# Patient Record
Sex: FEMALE | Race: WHITE | NOT HISPANIC OR LATINO | Employment: FULL TIME | URBAN - METROPOLITAN AREA
[De-identification: names, ages, dates, MRNs, and addresses within clinical notes are randomized per-mention and may not be internally consistent; named-entity substitution may affect disease eponyms.]

---

## 2017-02-24 ENCOUNTER — ALLSCRIPTS OFFICE VISIT (OUTPATIENT)
Dept: OTHER | Facility: OTHER | Age: 32
End: 2017-02-24

## 2017-02-24 DIAGNOSIS — R10.2 PELVIC AND PERINEAL PAIN: ICD-10-CM

## 2017-02-24 LAB
BILIRUB UR QL STRIP: NEGATIVE
CLARITY UR: NORMAL
COLOR UR: YELLOW
GLUCOSE (HISTORICAL): NEGATIVE
HCG, QUALITATIVE (HISTORICAL): NEGATIVE
HGB UR QL STRIP.AUTO: NEGATIVE
KETONES UR STRIP-MCNC: NEGATIVE MG/DL
LEUKOCYTE ESTERASE UR QL STRIP: NEGATIVE
NITRITE UR QL STRIP: NEGATIVE
PH UR STRIP.AUTO: 6 [PH]
PROT UR STRIP-MCNC: NEGATIVE MG/DL
SP GR UR STRIP.AUTO: 1.01
UROBILINOGEN UR QL STRIP.AUTO: 3.5

## 2017-02-27 ENCOUNTER — APPOINTMENT (OUTPATIENT)
Dept: LAB | Facility: HOSPITAL | Age: 32
End: 2017-02-27
Payer: COMMERCIAL

## 2017-02-27 DIAGNOSIS — R10.2 PELVIC AND PERINEAL PAIN: ICD-10-CM

## 2017-02-27 LAB
BILIRUB UR QL STRIP: NEGATIVE
CLARITY UR: CLEAR
COLOR UR: YELLOW
GLUCOSE UR STRIP-MCNC: NEGATIVE MG/DL
HGB UR QL STRIP.AUTO: NEGATIVE
KETONES UR STRIP-MCNC: NEGATIVE MG/DL
LEUKOCYTE ESTERASE UR QL STRIP: NEGATIVE
NITRITE UR QL STRIP: NEGATIVE
PH UR STRIP.AUTO: 6.5 [PH] (ref 4.5–8)
PROT UR STRIP-MCNC: NEGATIVE MG/DL
SP GR UR STRIP.AUTO: 1.01 (ref 1–1.03)
UROBILINOGEN UR QL STRIP.AUTO: 0.2 E.U./DL

## 2017-02-27 PROCEDURE — 81003 URINALYSIS AUTO W/O SCOPE: CPT

## 2017-03-01 ENCOUNTER — HOSPITAL ENCOUNTER (OUTPATIENT)
Dept: RADIOLOGY | Facility: HOSPITAL | Age: 32
Discharge: HOME | End: 2017-03-01
Payer: COMMERCIAL

## 2017-03-01 DIAGNOSIS — R10.2 PELVIC AND PERINEAL PAIN: ICD-10-CM

## 2017-03-01 PROCEDURE — 76830 TRANSVAGINAL US NON-OB: CPT

## 2017-03-01 PROCEDURE — 76856 US EXAM PELVIC COMPLETE: CPT

## 2017-03-02 ENCOUNTER — GENERIC CONVERSION - ENCOUNTER (OUTPATIENT)
Dept: OTHER | Facility: OTHER | Age: 32
End: 2017-03-02

## 2017-03-08 ENCOUNTER — ALLSCRIPTS OFFICE VISIT (OUTPATIENT)
Dept: OTHER | Facility: OTHER | Age: 32
End: 2017-03-08

## 2017-05-30 ENCOUNTER — ALLSCRIPTS OFFICE VISIT (OUTPATIENT)
Dept: OTHER | Facility: OTHER | Age: 32
End: 2017-05-30

## 2017-05-30 DIAGNOSIS — R22.2 LOCALIZED SWELLING, MASS AND LUMP, TRUNK: ICD-10-CM

## 2017-06-08 ENCOUNTER — HOSPITAL ENCOUNTER (OUTPATIENT)
Dept: RADIOLOGY | Facility: HOSPITAL | Age: 32
Discharge: HOME | End: 2017-06-08
Payer: COMMERCIAL

## 2017-06-08 ENCOUNTER — GENERIC CONVERSION - ENCOUNTER (OUTPATIENT)
Dept: OTHER | Facility: OTHER | Age: 32
End: 2017-06-08

## 2017-06-08 DIAGNOSIS — R22.2 LOCALIZED SWELLING, MASS AND LUMP, TRUNK: ICD-10-CM

## 2017-06-08 PROCEDURE — 76705 ECHO EXAM OF ABDOMEN: CPT

## 2017-06-29 ENCOUNTER — TRANSCRIBE ORDERS (OUTPATIENT)
Dept: LAB | Facility: HOSPITAL | Age: 32
End: 2017-06-29

## 2017-06-29 ENCOUNTER — APPOINTMENT (OUTPATIENT)
Dept: LAB | Facility: HOSPITAL | Age: 32
End: 2017-06-29
Payer: COMMERCIAL

## 2017-06-29 DIAGNOSIS — Z00.8 HEALTH EXAMINATION IN POPULATION SURVEY: Primary | ICD-10-CM

## 2017-06-29 DIAGNOSIS — Z00.8 HEALTH EXAMINATION IN POPULATION SURVEY: ICD-10-CM

## 2017-06-29 LAB
CHOLEST SERPL-MCNC: 114 MG/DL (ref 50–200)
EST. AVERAGE GLUCOSE BLD GHB EST-MCNC: 94 MG/DL
HBA1C MFR BLD: 4.9 % (ref 4.2–6.3)
HDLC SERPL-MCNC: 50 MG/DL (ref 40–60)
LDLC SERPL CALC-MCNC: 54 MG/DL (ref 0–100)
TRIGL SERPL-MCNC: 52 MG/DL

## 2017-06-29 PROCEDURE — 80061 LIPID PANEL: CPT

## 2017-06-29 PROCEDURE — 36415 COLL VENOUS BLD VENIPUNCTURE: CPT

## 2017-06-29 PROCEDURE — 83036 HEMOGLOBIN GLYCOSYLATED A1C: CPT

## 2017-08-08 ENCOUNTER — ALLSCRIPTS OFFICE VISIT (OUTPATIENT)
Dept: OTHER | Facility: OTHER | Age: 32
End: 2017-08-08

## 2017-08-10 ENCOUNTER — ALLSCRIPTS OFFICE VISIT (OUTPATIENT)
Dept: OTHER | Facility: OTHER | Age: 32
End: 2017-08-10

## 2017-08-10 DIAGNOSIS — E28.2 POLYCYSTIC OVARIAN SYNDROME: ICD-10-CM

## 2017-08-10 DIAGNOSIS — R53.83 OTHER FATIGUE: ICD-10-CM

## 2017-08-10 DIAGNOSIS — N63.0 BREAST LUMP: ICD-10-CM

## 2017-08-10 DIAGNOSIS — L68.0 HIRSUTISM: ICD-10-CM

## 2017-08-15 ENCOUNTER — HOSPITAL ENCOUNTER (OUTPATIENT)
Dept: MAMMOGRAPHY | Facility: CLINIC | Age: 32
Discharge: HOME | End: 2017-08-15
Payer: COMMERCIAL

## 2017-08-15 ENCOUNTER — HOSPITAL ENCOUNTER (OUTPATIENT)
Dept: ULTRASOUND IMAGING | Facility: CLINIC | Age: 32
Discharge: HOME | End: 2017-08-15
Payer: COMMERCIAL

## 2017-08-15 DIAGNOSIS — N63.0 BREAST LUMP: ICD-10-CM

## 2017-08-15 DIAGNOSIS — R92.8 ABNORMAL FINDINGS ON DIAGNOSTIC IMAGING OF BREAST: ICD-10-CM

## 2017-08-15 PROCEDURE — 76642 ULTRASOUND BREAST LIMITED: CPT

## 2017-08-15 PROCEDURE — G0204 DX MAMMO INCL CAD BI: HCPCS

## 2017-08-15 PROCEDURE — G0279 TOMOSYNTHESIS, MAMMO: HCPCS

## 2017-08-24 ENCOUNTER — ALLSCRIPTS OFFICE VISIT (OUTPATIENT)
Dept: OTHER | Facility: OTHER | Age: 32
End: 2017-08-24

## 2017-08-25 ENCOUNTER — TRANSCRIBE ORDERS (OUTPATIENT)
Dept: LAB | Facility: HOSPITAL | Age: 32
End: 2017-08-25

## 2017-08-25 ENCOUNTER — APPOINTMENT (OUTPATIENT)
Dept: LAB | Facility: HOSPITAL | Age: 32
End: 2017-08-25
Attending: INTERNAL MEDICINE
Payer: COMMERCIAL

## 2017-08-25 DIAGNOSIS — R53.83 OTHER FATIGUE: ICD-10-CM

## 2017-08-25 DIAGNOSIS — L68.0 HIRSUTISM: ICD-10-CM

## 2017-08-25 DIAGNOSIS — E28.2 POLYCYSTIC OVARIAN SYNDROME: ICD-10-CM

## 2017-08-25 LAB
ALBUMIN SERPL BCP-MCNC: 4.5 G/DL (ref 3.5–5)
ALP SERPL-CCNC: 51 U/L (ref 46–116)
ALT SERPL W P-5'-P-CCNC: 22 U/L (ref 12–78)
ANION GAP SERPL CALCULATED.3IONS-SCNC: 8 MMOL/L (ref 4–13)
AST SERPL W P-5'-P-CCNC: 17 U/L (ref 5–45)
BASOPHILS # BLD AUTO: 0.01 THOUSANDS/ΜL (ref 0–0.1)
BASOPHILS NFR BLD AUTO: 0 % (ref 0–1)
BILIRUB SERPL-MCNC: 1.6 MG/DL (ref 0.2–1)
BUN SERPL-MCNC: 11 MG/DL (ref 5–25)
CALCIUM SERPL-MCNC: 9.5 MG/DL (ref 8.3–10.1)
CHLORIDE SERPL-SCNC: 104 MMOL/L (ref 100–108)
CO2 SERPL-SCNC: 26 MMOL/L (ref 21–32)
CREAT SERPL-MCNC: 0.79 MG/DL (ref 0.6–1.3)
EOSINOPHIL # BLD AUTO: 0.09 THOUSAND/ΜL (ref 0–0.61)
EOSINOPHIL NFR BLD AUTO: 1 % (ref 0–6)
ERYTHROCYTE [DISTWIDTH] IN BLOOD BY AUTOMATED COUNT: 12.7 % (ref 11.6–15.1)
GFR SERPL CREATININE-BSD FRML MDRD: 100 ML/MIN/1.73SQ M
GLUCOSE P FAST SERPL-MCNC: 80 MG/DL (ref 65–99)
HCT VFR BLD AUTO: 42.6 % (ref 34.8–46.1)
HGB BLD-MCNC: 14.2 G/DL (ref 11.5–15.4)
INSULIN SERPL-ACNC: 3.3 MU/L (ref 3–25)
LYMPHOCYTES # BLD AUTO: 2.07 THOUSANDS/ΜL (ref 0.6–4.47)
LYMPHOCYTES NFR BLD AUTO: 28 % (ref 14–44)
MCH RBC QN AUTO: 30.1 PG (ref 26.8–34.3)
MCHC RBC AUTO-ENTMCNC: 33.3 G/DL (ref 31.4–37.4)
MCV RBC AUTO: 90 FL (ref 82–98)
MONOCYTES # BLD AUTO: 0.44 THOUSAND/ΜL (ref 0.17–1.22)
MONOCYTES NFR BLD AUTO: 6 % (ref 4–12)
NEUTROPHILS # BLD AUTO: 4.9 THOUSANDS/ΜL (ref 1.85–7.62)
NEUTS SEG NFR BLD AUTO: 65 % (ref 43–75)
NRBC BLD AUTO-RTO: 0 /100 WBCS
PLATELET # BLD AUTO: 205 THOUSANDS/UL (ref 149–390)
PMV BLD AUTO: 11.3 FL (ref 8.9–12.7)
POTASSIUM SERPL-SCNC: 3.9 MMOL/L (ref 3.5–5.3)
PROLACTIN SERPL-MCNC: 10.4 NG/ML
PROT SERPL-MCNC: 7.7 G/DL (ref 6.4–8.2)
RBC # BLD AUTO: 4.71 MILLION/UL (ref 3.81–5.12)
SODIUM SERPL-SCNC: 138 MMOL/L (ref 136–145)
T4 FREE SERPL-MCNC: 1.01 NG/DL (ref 0.76–1.46)
TSH SERPL DL<=0.05 MIU/L-ACNC: 0.84 UIU/ML (ref 0.36–3.74)
WBC # BLD AUTO: 7.53 THOUSAND/UL (ref 4.31–10.16)

## 2017-08-25 PROCEDURE — 85025 COMPLETE CBC W/AUTO DIFF WBC: CPT

## 2017-08-25 PROCEDURE — 36415 COLL VENOUS BLD VENIPUNCTURE: CPT

## 2017-08-25 PROCEDURE — 82157 ASSAY OF ANDROSTENEDIONE: CPT

## 2017-08-25 PROCEDURE — 84443 ASSAY THYROID STIM HORMONE: CPT

## 2017-08-25 PROCEDURE — 84143 ASSAY OF 17-HYDROXYPREGNENO: CPT

## 2017-08-25 PROCEDURE — 84402 ASSAY OF FREE TESTOSTERONE: CPT

## 2017-08-25 PROCEDURE — 84439 ASSAY OF FREE THYROXINE: CPT

## 2017-08-25 PROCEDURE — 83525 ASSAY OF INSULIN: CPT

## 2017-08-25 PROCEDURE — 80053 COMPREHEN METABOLIC PANEL: CPT

## 2017-08-25 PROCEDURE — 84403 ASSAY OF TOTAL TESTOSTERONE: CPT

## 2017-08-25 PROCEDURE — 84146 ASSAY OF PROLACTIN: CPT

## 2017-08-25 PROCEDURE — 83498 ASY HYDROXYPROGESTERONE 17-D: CPT

## 2017-08-25 PROCEDURE — 82627 DEHYDROEPIANDROSTERONE: CPT

## 2017-08-26 LAB
DHEA-S SERPL-MCNC: 324.7 UG/DL (ref 84.8–378)
TESTOST FREE SERPL-MCNC: 2.1 PG/ML (ref 0–4.2)
TESTOST SERPL-MCNC: 39 NG/DL (ref 8–48)

## 2017-08-30 ENCOUNTER — GENERIC CONVERSION - ENCOUNTER (OUTPATIENT)
Dept: OTHER | Facility: OTHER | Age: 32
End: 2017-08-30

## 2017-08-30 LAB
17OHP SERPL-MCNC: 191 NG/DL
ANDROST SERPL-MCNC: 109 NG/DL (ref 41–262)

## 2017-08-31 LAB — 17OH-PREG SERPL-MCNC: 124 NG/DL

## 2017-10-10 ENCOUNTER — ALLSCRIPTS OFFICE VISIT (OUTPATIENT)
Dept: OTHER | Facility: OTHER | Age: 32
End: 2017-10-10

## 2017-10-28 NOTE — PROGRESS NOTES
Assessment    1  Dysmenorrhea (625 3) (N94 6)    Plan  Dysmenorrhea    · Ketorolac Tromethamine 10 MG Oral Tablet; TAKE 1 TABLET Every 8 hours PRNdysmenorrhea   Rx By: Josiah Huggins; Dispense: 10 Days ; #:30 Tablet; Refill: 0;Dysmenorrhea; REYNALDO = N; Verified Transmission to [address]; Last Updated By: [employer]; 10/10/2017 11:05:43 AM    Discussion/Summary  Discussion Summary:   #1  Discussed treatment options including nonsteroidals versus OCPs versus Lysteda  Risks and benefits reviewed  She will try Toradol 10 mg p o  q 8 hours p r n  dysmenorrhea dispensed 30 tablets zero refills  She will call with an update after her next cycle  Return to office July 2018 for annual GYN visit  Medication SE Review and Pt Understands Tx: Possible side effects of new medications were reviewed with the patient/guardian today  The treatment plan was reviewed with the patient/guardian  The patient/guardian understands and agrees with the treatment plan      Chief Complaint  Chief Complaint Free Text Note Form: abdominal cramping      History of Present Illness  HPI: This is a 55-year-old female G0 who presents complaining of increase in menstrual cramps  She has a long history of dysmenorrhea where she uses Motrin 800 mg on a monthly basis  In the last cycle or 2 she's had increasing cramping where Motrin was not working as well  She did follow-up with endocrinology for reevaluation for polycystic ovaries  She has been diagnosed in her early 25s  She was informed by endocrinology that all her labs were normal which was inconsistent with a PCO diagnosis  Patient has had significant lifestyle changes over the last decade  Her menstrual cycles went from amenorrhea 2 now more normal every 28-34 days lasting 6 days with no breakthrough bleeding  Her cycles are heavy 2-3 days  She did have a pelvic ultrasound in March which was essentially normal, no evidence of multi-follicles   She is not been sexually active in over 5 years  was on birth control pills in 2008  At this point she would like to hold off on hormones  She continues to follow-up with dermatology for acne  Review of Systems  Focused-Female:  Cardiovascular: no complaints of slow or fast heart rate, no chest pain, no palpitations, no leg claudication or lower extremity edema  Respiratory: no complaints of shortness of breath, no wheezing, no dyspnea on exertion, no orthopnea or PND  Gastrointestinal: no complaints of abdominal pain, no constipation, no nausea or diarrhea, no vomiting, no bloody stools  Genitourinary: as noted in HPI  ROS Reviewed:   ROS reviewed  Active Problems  1  Dysmenorrhea (625 3) (N94 6)   2  Encounter for annual routine gynecological examination (V72 31) (Z01 419)   3  Fatigue (780 79) (R53 83)   4  Female pelvic pain (625 9) (R10 2)   5  Hirsutism (704 1) (L68 0)   6  Left breast lump (611 72) (N63 20)   7  Lower back pain (724 2) (M54 5)   8  Menorrhagia (626 2) (N92 0)   9  Palpable mass of lower back (782 2) (R22 2)   10  PCOS (polycystic ovarian syndrome) (256 4) (E28 2)    Past Medical History  1  History of Acute otitis media, unspecified laterality   2  History of Arm pain (729 5) (M79 603)   3  History of Dysfunction of left eustachian tube (381 81) (H69 82)   4  History of Dysfunction of right eustachian tube (381 81) (H69 81)   5  History of acne (V13 3) (Z87 2)   6  History of backache (V13 59) (Z87 39)   7  History of pregnancy (V13 29)   8  History of Jaw pain (784 92) (R68 84)   9  History of Left shoulder pain (719 41) (M25 512)   10  History of Menarche (V21 8)   11  History of Mouth sores (528 9) (K13 79)   12  History of Otalgia, unspecified laterality (388 70) (H92 09)   13  History of Vision problem (V41 0) (H54 7)  Active Problems And Past Medical History Reviewed: The active problems and past medical history were reviewed and updated today  Surgical History  1   History of Oral Surgery Tooth Extraction   2  History of Skin Debridement Right Knee  Surgical History Reviewed: The surgical history was reviewed and updated today  Family History  Mother    1  Family history of asthma (V17 5) (Z82 5)  Father    2  Family history of hypertension (V17 49) (Z82 49)  Sister    3  Family history of Cystitis, interstitial   4  Family history of endometriosis (V19 8) (Z84 2)  Paternal Grandmother    11  Family history of lung cancer (V16 1) (Z80 1)  Maternal Grandfather    6  Family history of Prostate cancer  Family History    7  Family history of Anxiety (Symptom)   8  Family history of Depression  Family History Reviewed: The family history was reviewed and updated today  Social History     · Denied: History of Alcohol use   · Never A Smoker   · History of Vegetarian (V49 89) (Z78 9)  Social History Reviewed: The social history was reviewed and updated today  Current Meds   1  Epiduo 0 1-2 5 % External Gel; APPLY 45 GM Daily one application every day  Requested for: 80PQW9121; Last Rx:28Oct2016 Ordered   2  Ibuprofen 800 MG Oral Tablet; TAKE 1 TABLET 3 TIMES DAILY AS NEEDED; Therapy: 47EUZ4638 to (Evaluate:16Nov2016)  Requested for: 00AYV2811; Last Rx:48Oan7407 Ordered   3  Multi-Day Vitamins TABS; Therapy: (Recorded:08Mar2014) to Recorded   4  Omega-3 CAPS; Therapy: (Trey Truong) to Recorded   5  TiZANidine HCl - 2 MG Oral Tablet; 1 tab q 8hours prn pain; Therapy: 45YQE0084 to (Evaluate:06Jun2017)  Requested for: 71ZTD0272; Last Rx:86Mef2587 Ordered  Medication List Reviewed: The medication list was reviewed and updated today  Allergies  1  Amoxicillin CAPS   2  Penicillins   3  Sulfa Drugs  4   Mold    Vitals  Vital Signs    Recorded: 04HLL1616 33:69YU   Systolic 481   Diastolic 70   Height 5 ft 6 in   Weight 162 lb 0 96 oz   BMI Calculated 26 16   BSA Calculated 1 84   LMP 05-Oct-2017       Physical Exam   Constitutional  General appearance: No acute distress, well appearing and well nourished  Pulmonary  Auscultation of lungs: Clear to auscultation  Cardiovascular  Auscultation of heart: Normal rate and rhythm, normal S1 and S2, no murmurs  Future Appointments    Date/Time Provider Specialty Site   02/08/2018 03:30 PM KEVEN Fraga   [facility]       Signatures   Electronically signed by : Son Gaitan DO; Oct 10 2017 11:09AM EST                       (Author)

## 2017-12-07 ENCOUNTER — ALLSCRIPTS OFFICE VISIT (OUTPATIENT)
Dept: OTHER | Facility: OTHER | Age: 32
End: 2017-12-07

## 2017-12-08 NOTE — PROGRESS NOTES
Assessment    1  Non smoker / tobacco user (V44 89) (Z78 9)   2  Acute upper respiratory infection (465 9) (J06 9)    Plan  Acute upper respiratory infection    · Azithromycin 250 MG Oral Tablet; TAKE 2 TABLETS ON DAY 1 THEN TAKE 1TABLET A DAY FOR 4 DAYS    Discussion/Summary    Increase oral fluidsmeds as neededif not improvng  The patient was counseled regarding impressions  Possible side effects of new medications were reviewed with the patient/guardian today  The treatment plan was reviewed with the patient/guardian  The patient/guardian understands and agrees with the treatment plan      Chief Complaint  Patient presents to office today with a cold that started Monday  Patient has been coughing up yellowish phlegm  She has been taking OTC medication  History of Present Illness  HPI: 3 days of scratchy throat, sinus congestion drainage productive coughfever chills body achesvomiting diarrheawork coming upwas just diagnosed with pancreatic CA      Review of Systems   Constitutional: as noted in HPI   ENT: as noted in HPI  Respiratory: as noted in HPI  Gastrointestinal: as noted in HPI  Active Problems  1  Dysmenorrhea (625 3) (N94 6)   2  Encounter for annual routine gynecological examination (V72 31) (Z01 419)   3  Fatigue (780 79) (R53 83)   4  Female pelvic pain (625 9) (R10 2)   5  Hirsutism (704 1) (L68 0)   6  Left breast lump (611 72) (N63 20)   7  Lower back pain (724 2) (M54 5)   8  Menorrhagia (626 2) (N92 0)   9  Palpable mass of lower back (782 2) (R22 2)   10  PCOS (polycystic ovarian syndrome) (256 4) (E28 2)    Past Medical History  Active Problems And Past Medical History Reviewed: The active problems and past medical history were reviewed and updated today  Surgical History  Surgical History Reviewed: The surgical history was reviewed and updated today         Social History     · Denied: History of Alcohol use   · Non smoker / tobacco user (H65 89) (Z78 9)   · History of Vegetarian (V49 89) (Z78 9)  The social history was reviewed and updated today  Family History  Family History Reviewed: The family history was reviewed and updated today  Current Meds   1  Epiduo 0 1-2 5 % External Gel; APPLY 45 GM Daily one application every day  Requested for: 93UER1130; Last Rx:28Oct2016 Ordered   2  Ibuprofen 800 MG Oral Tablet; TAKE 1 TABLET 3 TIMES DAILY AS NEEDED; Therapy: 48KGP9626 to (Evaluate:16Nov2016)  Requested for: 12YWQ6438; Last Rx:67Hkq6278 Ordered   3  Ketorolac Tromethamine 10 MG Oral Tablet; TAKE 1 TABLET Every 8 hours PRN dysmenorrhea; Therapy: 64YDM9782 to (Georg Ormond)  Requested for: 50XER8511; Last Rx:10Oct2017 Ordered   4  Multi-Day Vitamins TABS; Therapy: (Recorded:08Mar2014) to Recorded   5  [place]-3 CAPS; Therapy: (Kt Jim) to Recorded   6  TiZANidine HCl - 2 MG Oral Tablet; 1 tab q 8hours prn pain; Therapy: 68TZX6769 to (Evaluate:06Jun2017)  Requested for: 28PMM3199; Last Rx:34Zqt5636 Ordered    The medication list was reviewed and updated today  Allergies  1  Amoxicillin CAPS   2  Penicillins   3  Sulfa Drugs  4  Mold    Vitals   Recorded: 74WVX8736 03:05PM   Temperature 99 8 F, Oral   Heart Rate 170   Systolic 107   Diastolic 76   Height 5 ft 6 in   Weight 161 lb 2 oz   BMI Calculated 26 01   BSA Calculated 1 82   O2 Saturation 99       Physical Exam   Constitutional  General appearance: Abnormal   appears tired  Ears, Nose, Mouth, and Throat  Otoscopic examination: Tympanic membranes translucent with normal light reflex  Canals patent without erythema  Oropharynx: Normal with no erythema, edema, exudate or lesions  Pulmonary  Respiratory effort: No increased work of breathing or signs of respiratory distress  Auscultation of lungs: Clear to auscultation  Cardiovascular  Auscultation of heart: Normal rate and rhythm, normal S1 and S2, without murmurs  Abdomen  Abdomen: Non-tender, no masses     Liver and spleen: No hepatomegaly or splenomegaly  Lymphatic  Palpation of lymph nodes in neck: No lymphadenopathy  Musculoskeletal  Gait and station: Normal    Psychiatric  Orientation to person, place, and time: Normal    Mood and affect: Normal          Message  Return to work or school:   Alice Sutherland is under my professional care  She was seen in my office on 12/7/17       Please excuse from work 12/7/17-12/8/17 dx: acute upper respiratory infection  Future Appointments    Date/Time Provider Specialty Site   02/08/2018 03:30 PM KEVEN Gregory   [facility]       Signatures   Electronically signed by : KEVEN Valdez ; Dec  7 2017  3:19PM EST                       (Author)

## 2018-01-10 NOTE — RESULT NOTES
Verified Results  US ABDOMEN LIMITED 41Dox9996 08:14AM Riki Marino   TW Order Number: [id number]   Performing Comments: right lower back lesion   - Patient Instructions: To schedule this appointment, please contact Central Scheduling at [phone]  Test Name Result Flag Reference   US ABDOMEN LIMITED (Report)     ULTRASOUND RIGHT BACK     TECHNIQUE:  Using a high frequency transducer, the right back was scanned to to evaluate for lump  INDICATION: 35-year-old female who describes a vague, palpable lump in the right lower back since February  COMPARISON: Abdomen and pelvic CT from 11/29/2004  FINDINGS:     Over the right lower back where patient feels a lump, ultrasound demonstrates no definite mass  On image 18, a 3 6 x 1 4 x 2 9 cm area in the subcutaneous fat was measured, which could potentially be a subcutaneous lipoma, but it does not definitely    stand out adjacent to the rest of the subcutaneous fat, and is symmetric to the left side  IMPRESSION:     No definite mass identified in the right lower back where patient feels a lump  A subcutaneous lipoma in that area cannot be completely excluded, but there are no suspicious masses         Workstation performed: [id number]     Signed by:   Aisha Underwood MD   6/8/17

## 2018-01-12 VITALS
DIASTOLIC BLOOD PRESSURE: 76 MMHG | BODY MASS INDEX: 26.04 KG/M2 | WEIGHT: 162.03 LBS | HEIGHT: 66 IN | SYSTOLIC BLOOD PRESSURE: 120 MMHG | TEMPERATURE: 98.5 F | OXYGEN SATURATION: 99 % | RESPIRATION RATE: 18 BRPM | HEART RATE: 87 BPM

## 2018-01-12 NOTE — RESULT NOTES
Verified Results  (1) TSH 73CGA4519 10:07AM Suma Silver Order Number: [id number]  TW Order Number: [id number] Order Number: [id number] Order Number: [id number] Order Number: [id number] Patient Instructions: This bloodwork is non-fasting  Please drink two glasses of water morning of     Test Name Result Flag Reference   TSH 1 350 uIU/mL  0 358-3 740   - Patient Instructions: This bloodwork is non-fasting  Please drink two glasses of water morning of bloodwork  bloodwork  Patients undergoing fluorescein dye angiography may retain small amounts of fluorescein in the body for 48-72 hours post procedure  Samples containing fluorescein can produce falsely depressed TSH values  If the patient had this procedure,a specimen should be resubmitted post fluorescein clearance            The recommended reference ranges for TSH during pregnancy are as follows:  First trimester 0 1 to 2 5 uIU/mL  Second trimester  0 2 to 3 0 uIU/mL  Third trimester 0 3 to 3 0 uIU/m

## 2018-01-12 NOTE — PROGRESS NOTES
Assessment    1  Female pelvic pain (625 9) (R10 2)    Discussion/Summary  Discussion Summary:   I believe this patient probably had a ruptured ovarian cyst which is now resolved  She is counseled if this happens again reminded consider suppression with the birth control pill  She will watch and consider  She'll keep us informed of her progress  Self Referrals:   Self Referrals: Yes      Chief Complaint  Chief Complaint Free Text Note Form: Pelvic pain    This is a 61-year-old white female, she is nulliparous  She has a history of PCO and a septated uterus  She is not sexually active  She's now being evaluated for right-sided pelvic pain that began around 13 February  It was sharp last almost 2 weeks  She did take ibuprofen get relief  She was seen by her family doctor who ordered a urinalysis which is negative  We also ordered an ultrasound on 1 March  Ultrasound was reviewed today and it was completely negative both ovaries were normal  The pain is now resolved  Abdominal exam was benign there is no CVA tenderness  Pelvic exam was deferred because of the ultrasound  Active Problems    1  Dysmenorrhea (625 3) (N94 6)   2  Encounter for annual routine gynecological examination (V72 31) (Z01 419)   3  Female pelvic pain (625 9) (R10 2)   4  Left breast lump (611 72) (N63)   5  Menorrhagia (626 2) (N92 0)   6  PCOS (polycystic ovarian syndrome) (256 4) (E28 2)    Past Medical History    1  History of Acute otitis media, unspecified laterality   2  History of Arm pain (729 5) (M79 603)   3  History of Dysfunction of left eustachian tube (381 81) (H69 82)   4  History of Dysfunction of right eustachian tube (381 81) (H69 81)   5  History of acne (V13 3) (Z87 2)   6  History of backache (V13 59) (Z87 39)   7  History of pregnancy (V13 29)   8  History of Jaw pain (784 92) (R68 84)   9  History of Left shoulder pain (719 41) (M25 512)   10  History of Menarche (V21 8)   11   History of Mouth sores (528 9) (K13 79)   12  History of Otalgia, unspecified laterality (388 70) (H92 09)   13  History of Vision problem (V41 0) (H54 7)    Surgical History    1  History of Oral Surgery Tooth Extraction   2  History of Skin Debridement Right Knee    Family History  Mother    1  Family history of asthma (V17 5) (Z82 5)  Father    2  Family history of hypertension (V17 49) (Z82 49)  Paternal Grandmother    3  Family history of lung cancer (V16 1) (Z80 1)  Maternal Grandfather    4  Family history of Prostate cancer  Family History    5  Family history of Anxiety (Symptom)   6  Family history of Depression    Social History    · Denied: History of Alcohol use   · Never A Smoker   · Non smoker (V49 89) (Z78 9)   · History of Vegetarian (V49 89) (Z78 9)    Current Meds   1  Epiduo 0 1-2 5 % External Gel; APPLY 45 GM Daily one application every day  Requested   for: 38DUA9681; Last Rx:28Oct2016 Ordered   2  Ibuprofen 800 MG Oral Tablet; TAKE 1 TABLET 3 TIMES DAILY AS NEEDED; Therapy: 68YJL6919 to (Evaluate:16Nov2016)  Requested for: 74YQJ3596; Last   Rx:69Zfd9710 Ordered   3  Multi-Day Vitamins TABS; Therapy: (Recorded:08Mar2014) to Recorded   4  Omega-3 CAPS; Therapy: (Recorded:74Hfn9896) to Recorded    Allergies    1  Amoxicillin CAPS   2  Penicillins   3  Sulfa Drugs    4   Mold    Vitals  Vital Signs    Recorded: 22JGE8424 45:50IN   Systolic 998   Diastolic 72   Height 5 ft 6 in   Weight 161 lb    BMI Calculated 25 99   BSA Calculated 1 82   LMP 85SAA5415     Signatures   Electronically signed by : KEVEN Saleh ; Mar  8 2017  2:38PM EST                       (Author)

## 2018-01-13 VITALS
DIASTOLIC BLOOD PRESSURE: 72 MMHG | WEIGHT: 161 LBS | SYSTOLIC BLOOD PRESSURE: 118 MMHG | BODY MASS INDEX: 25.88 KG/M2 | HEIGHT: 66 IN

## 2018-01-13 VITALS
TEMPERATURE: 97.6 F | DIASTOLIC BLOOD PRESSURE: 62 MMHG | BODY MASS INDEX: 25.56 KG/M2 | HEIGHT: 66 IN | OXYGEN SATURATION: 99 % | HEART RATE: 72 BPM | SYSTOLIC BLOOD PRESSURE: 106 MMHG | WEIGHT: 159.04 LBS | RESPIRATION RATE: 16 BRPM

## 2018-01-13 VITALS
DIASTOLIC BLOOD PRESSURE: 70 MMHG | SYSTOLIC BLOOD PRESSURE: 110 MMHG | WEIGHT: 162.06 LBS | HEIGHT: 66 IN | BODY MASS INDEX: 26.05 KG/M2

## 2018-01-13 VITALS
WEIGHT: 162 LBS | BODY MASS INDEX: 26.03 KG/M2 | OXYGEN SATURATION: 99 % | DIASTOLIC BLOOD PRESSURE: 68 MMHG | TEMPERATURE: 98.9 F | HEIGHT: 66 IN | RESPIRATION RATE: 16 BRPM | HEART RATE: 89 BPM | SYSTOLIC BLOOD PRESSURE: 110 MMHG

## 2018-01-13 VITALS
HEIGHT: 66 IN | HEART RATE: 64 BPM | DIASTOLIC BLOOD PRESSURE: 76 MMHG | SYSTOLIC BLOOD PRESSURE: 120 MMHG | BODY MASS INDEX: 26.2 KG/M2 | WEIGHT: 163.06 LBS

## 2018-01-13 NOTE — RESULT NOTES
Discussion/Summary   labs normal     Verified Results  (1) COMPREHENSIVE METABOLIC PANEL 63ZHH0831 99:12OS Tigre Hart Order Number: [id number]     Test Name Result Flag Reference   SODIUM 138 mmol/L  136-145   POTASSIUM 3 9 mmol/L  3 5-5 3   CHLORIDE 104 mmol/L  100-108   CARBON DIOXIDE 26 mmol/L  21-32   ANION GAP (CALC) 8 mmol/L  4-13   BLOOD UREA NITROGEN 11 mg/dL  5-25   CREATININE 0 79 mg/dL  0 60-1 30   Standardized to IDMS reference method   CALCIUM 9 5 mg/dL  8 3-10 1   BILI, TOTAL 1 60 mg/dL H 0 20-1 00   ALK PHOSPHATAS 51 U/L     ALT (SGPT) 22 U/L  12-78   Specimen collection should occur prior to Sulfasalazine and/or Sulfapyridine administration due to the potential for falsely depressed results  AST(SGOT) 17 U/L  5-45   Specimen collection should occur prior to Sulfasalazine administration due to the potential for falsely depressed results  ALBUMIN 4 5 g/dL  3 5-5 0   TOTAL PROTEIN 7 7 g/dL  6 4-8 2   eGFR 100 ml/min/1 73sq m     National Kidney Disease Education Program recommendations are as follows:  GFR calculation is accurate only with a steady state creatinine  Chronic Kidney disease less than 60 ml/min/1 73 sq  meters  Kidney failure less than 15 ml/min/1 73 sq  meters  GLUCOSE FASTING 80 mg/dL  65-99   Specimen collection should occur prior to Sulfasalazine administration due to the potential for falsely depressed results  Specimen collection should occur prior to Sulfapyridine administration due to the potential for falsely elevated results       (1) INSULIN, FASTING 95Nsu5131 11:04AM Tigre Hart Order Number: [id number]     Test Name Result Flag Reference   INSULIN, FASTING 3 3 mU/L  3 0-25 0     (1) TSH 76Uhd5646 11:04AM Wyatt Godoy    Order Number: [id number]     Test Name Result Flag Reference   TSH 0 842 uIU/mL  0 358-3 740   Patients undergoing fluorescein dye angiography may retain small amounts of fluorescein in the body for 48-72 hours post procedure  Samples containing fluorescein can produce falsely depressed TSH values  If the patient had this procedure,a specimen should be resubmitted post fluorescein clearance  The recommended reference ranges for TSH during pregnancy are as follows:  First trimester 0 1 to 2 5 uIU/mL  Second trimester  0 2 to 3 0 uIU/mL  Third trimester 0 3 to 3 0 uIU/m     (1) T4, FREE 78Izi9226 11:04AM Irving Crawford    Order Number: [id number]     Test Name Result Flag Reference   T4,FREE 1 01 ng/dL  0 76-1 46   Specimen collection should occur prior to Sulfasalazine administration due to the potential for falsely elevated results  (1) PROLACTIN 10Pgs3919 11:04AM Db Jaimes Order Number: [id number]     Test Name Result Flag Reference   PROLACTIN 10 4 ng/mL     PROLACTIN:     Females:   ? ??Non-pregnant ??? ???2 2-30 3 ? ? ?ng/mL   ? ??Pregnant ??? ??? ??? ???8 1-347 6 ng/mL   ? ? ?Post-menopausal 0 7-31 5 ? ??ng/mL     (1) TESTOSTERONE, FREE (DIRECT) AND TOTAL 65Zpq5430 11:04AM Db Jaimes Order Number: [id number]     Test Name Result Flag Reference   FREE TESTOSTERONE, DIRECT 2 1 pg/mL  0 0 - 4 2   TESTOSTERONE (TOTAL) 39 ng/dL  8 - 48   Performed at:  [address] [place] [address]  [id number]  : Natasha Tamayo MD, Phone:  [phone]     (1) DHEA-S 60IKY7588 11:04AM Db Jaimes Order Number: [id number]     Test Name Result Flag Reference   DHEA-SULFATE 324 7 ug/dL  84 8 - 378 0   Performed at:  [facility] [place] [address]  [id number]  : Natasha Tamayo MD, Phone:  [phone]     (1) 48-C-KS-PROGESTERONE 66MLQ1631 11:04AM Db Jaimes Order Number: [id number]     Test Name Result Flag Reference   17-A(OH)PROGEST 191 ng/dL     Adult Female                             Follicular        15 -  70                             Luteal            35 - 290  This test was developed and its performance characteristics  determined by [facility]  It has not been cleared or approved  by the Food and Drug Administration  Performed at:  [address], [place]  [id number]  : Parker García MD, Phone:  [phone]     (1) ANDROSTENEDIONE 28BYE4690 11:04AM Arthor Brittle Order Number: [id number]     Test Name Result Flag Reference   ANDROSTENEDIONE 109 ng/dL  41 - 262   This test was developed and its performance characteristics  determined by [facility]  It has not been cleared or approved  by the Food and Drug Administration  Performed at:  [address], [place]  [id number]  : Parker García MD, Phone:  [phone]     (1) CBC/PLT/DIFF 07YDP2926 11:04AM Zeeboor Brittle Order Number: [id number]     Test Name Result Flag Reference   WBC COUNT 7 53 Thousand/uL  4 31-10 16   RBC COUNT 4 71 Million/uL  3 81-5 12   HEMOGLOBIN 14 2 g/dL  11 5-15 4   HEMATOCRIT 42 6 %  34 8-46  1   MCV 90 fL  82-98   MCH 30 1 pg  26 8-34 3   MCHC 33 3 g/dL  31 4-37 4   RDW 12 7 %  11 6-15 1   MPV 11 3 fL  8 9-12 7   PLATELET COUNT 620 Thousands/uL  149-390   nRBC AUTOMATED 0 /100 WBCs     NEUTROPHILS RELATIVE PERCENT 65 %  43-75   LYMPHOCYTES RELATIVE PERCENT 28 %  14-44   MONOCYTES RELATIVE PERCENT 6 %  4-12   EOSINOPHILS RELATIVE PERCENT 1 %  0-6   BASOPHILS RELATIVE PERCENT 0 %  0-1   NEUTROPHILS ABSOLUTE COUNT 4 90 Thousands/? ??L  1 85-7 62   LYMPHOCYTES ABSOLUTE COUNT 2 07 Thousands/? ??L  0 60-4 47   MONOCYTES ABSOLUTE COUNT 0 44 Thousand/? ??L  0 17-1 22   EOSINOPHILS ABSOLUTE COUNT 0 09 Thousand/? ??L  0 00-0 61   BASOPHILS ABSOLUTE COUNT 0 01 Thousands/? ??L  0 00-0 10

## 2018-01-14 VITALS
WEIGHT: 163 LBS | BODY MASS INDEX: 26.2 KG/M2 | DIASTOLIC BLOOD PRESSURE: 74 MMHG | HEIGHT: 66 IN | SYSTOLIC BLOOD PRESSURE: 120 MMHG

## 2018-01-14 NOTE — RESULT NOTES
Message   Ultrasound shows sub-septated uterus (I think she knows about this) and a  right ovarian follicle with some fluid around it  Follow up with gyn next week as scheduled           Verified Results  * US PELVIS COMPLETE [facility] AND TRANSVAGINAL) [id number] 07:19AM Simon Gee Order Number: [id number]    - Patient Instructions: To schedule this appointment, please contact Central Scheduling at [phone]  Test Name Result Flag Reference   US PELVIS COMPLETE (TRANSABDOMINAL AND TRANSVAGINAL) (Report)     PELVIC ULTRASOUND, COMPLETE     INDICATION: 35-year-old female with lower pelvic pain radiating to the right lower back  The patient has an MRI confirmed sub-septate uterine anomaly  LMP was 2/3/2017  COMPARISON: None  TECHNIQUE:  Transabdominal pelvic ultrasound was performed in sagittal and transverse planes with a curvilinear transducer  Additional transvaginal imaging was performed to better evaluate the endometrium and ovaries  Imaging included volumetric    sweeps as well as traditional still imaging technique  FINDINGS:     UTERUS:   The subseptated uterus is anteverted in position, measuring 8 9 x 3 6 x 6 5 cm  Contour and echotexture appear normal      The cervix shows no suspicious abnormality  ENDOMETRIUM:    Normal endometrial caliber of the right-sided moiety measuring 14 mm  The left-sided moiety measures 11 mm  Homogenous and normal in appearance  OVARIES/ADNEXA:   Right ovary: 3 9 x 2 6 x 3 3 cm  No suspicious right ovarian abnormality  An involuting follicle is noted with minimal fluid adjacent to the ovary, physiologic  Doppler flow within normal limits  Left ovary: 3 2 x 1 8 x 3 9 cm  No suspicious left ovarian abnormality  Doppler flow within normal limits  No suspicious adnexal mass or loculated collections  There is no free fluid  IMPRESSION:        1  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

## 2018-01-15 NOTE — RESULT NOTES
Verified Results  (1) T4, FREE [id number] 10:07AM Rik Vargas    Order Number: [id number]  TW Order Number: [id number] Order Number: [id number] Order Number: [id number] Order Number: [id number] Patient Instructions: This bloodwork is non-fasting  Please drink two glasses of water morning of     Test Name Result Flag Reference   T4,FREE 0 86 ng/dL  0 76-1 46   bloodwork  Plan  Encounter for annual routine gynecological examination    · (1) THIN PREP PAP WITH IMAGING; Status: In Progress - Specimen/Data Collected;    Done: [id number]  Maturation index required? : No  : 06/2016  HPV? : if ASCUS

## 2018-01-23 VITALS
DIASTOLIC BLOOD PRESSURE: 76 MMHG | HEART RATE: 105 BPM | WEIGHT: 161.13 LBS | HEIGHT: 66 IN | TEMPERATURE: 99.8 F | OXYGEN SATURATION: 99 % | SYSTOLIC BLOOD PRESSURE: 116 MMHG | BODY MASS INDEX: 25.9 KG/M2

## 2018-01-23 NOTE — MISCELLANEOUS
Message  Return to work or school:   Jeff Valadez is under my professional care  She was seen in my office on 12/7/17       Please excuse from work 12/7/17-12/8/17 dx: acute upper respiratory infection  Past Medical History  Active Problems And Past Medical History Reviewed: The active problems and past medical history were reviewed and updated today  Family History  Family History Reviewed: The family history was reviewed and updated today  Surgical History  Surgical History Reviewed: The surgical history was reviewed and updated today         Future Appointments    Signatures   Electronically signed by : KEVEN Dover ; Dec  7 2017  3:19PM EST                       (Author)

## 2018-02-08 ENCOUNTER — OFFICE VISIT (OUTPATIENT)
Dept: SURGICAL ONCOLOGY | Facility: CLINIC | Age: 33
End: 2018-02-08
Payer: COMMERCIAL

## 2018-02-08 VITALS
RESPIRATION RATE: 16 BRPM | SYSTOLIC BLOOD PRESSURE: 112 MMHG | WEIGHT: 142 LBS | TEMPERATURE: 98.8 F | HEIGHT: 66 IN | HEART RATE: 95 BPM | DIASTOLIC BLOOD PRESSURE: 70 MMHG | BODY MASS INDEX: 22.82 KG/M2

## 2018-02-08 DIAGNOSIS — N60.11 FIBROCYSTIC BREAST CHANGES OF BOTH BREASTS: Primary | ICD-10-CM

## 2018-02-08 DIAGNOSIS — R92.2 DENSE BREAST TISSUE: ICD-10-CM

## 2018-02-08 DIAGNOSIS — N60.12 FIBROCYSTIC BREAST CHANGES OF BOTH BREASTS: Primary | ICD-10-CM

## 2018-02-08 PROBLEM — R92.30 DENSE BREAST TISSUE: Status: ACTIVE | Noted: 2018-02-08

## 2018-02-08 PROCEDURE — 99214 OFFICE O/P EST MOD 30 MIN: CPT | Performed by: SURGERY

## 2018-02-08 RX ORDER — IBUPROFEN 800 MG/1
1 TABLET ORAL 3 TIMES DAILY PRN
COMMUNITY
Start: 2016-07-19 | End: 2019-08-29 | Stop reason: SDUPTHER

## 2018-02-08 RX ORDER — KETOROLAC TROMETHAMINE 10 MG/1
TABLET, FILM COATED ORAL EVERY 8 HOURS
COMMUNITY
Start: 2017-10-10 | End: 2018-05-24

## 2018-02-08 RX ORDER — MULTIVITAMIN
TABLET ORAL
COMMUNITY

## 2018-02-08 RX ORDER — ADAPALENE AND BENZOYL PEROXIDE .1; 2.5 G/100G; G/100G
GEL TOPICAL DAILY
COMMUNITY
End: 2018-05-24

## 2018-02-08 NOTE — PROGRESS NOTES
Surgical Oncology Follow Up       [address]  [facility] [address] [address]    Adam Resendiz  1985  [id number]  [address]  [facility] [address] [address]    No chief complaint on file  left breast lump    Assessment/Plan     Advance Care Planning/Advance Directives:  Did not discuss  with the patient  No history exists  History of Present Illness:  Patient states she feels the area of concern in the left breast but also has lumpy tissue throughout on both sides, she reports some discomfort that is worse around the time of her period  -Interval History:n/a    Review of Systems:  Review of Systems   Constitutional: Negative  Negative for appetite change and fever  Eyes: Negative  Respiratory: Negative for shortness of breath  Cardiovascular: Negative  Gastrointestinal: Negative  Endocrine: Negative  Genitourinary: Negative  Musculoskeletal: Negative  Negative for arthralgias and myalgias  Skin: Negative  Allergic/Immunologic: Negative  Neurological: Negative  Hematological: Negative  Negative for adenopathy  Does not bruise/bleed easily  Psychiatric/Behavioral: Negative  Patient Active Problem List   Diagnosis    Fibrocystic breast changes of both breasts    Dense breast tissue     Past Medical History:   Diagnosis Date    PCOS (polycystic ovarian syndrome)     Upper back pain     Wears glasses      No past surgical history on file  Family History   Problem Relation Age of Onset    Prostate cancer Maternal Grandfather [de-identified]    Lung cancer Paternal Grandmother 72    Psychiatric Illness Family     Pancreatic cancer Mother 61     Social History     Social History    Marital status: Single     Spouse name: N/A    Number of children: N/A    Years of education: N/A     Occupational History    Not on file  Social History Main Topics    Smoking status: Never Smoker    Smokeless tobacco: Never Used    Alcohol use No    Drug use: No    Sexual activity: Not on file     Other Topics Concern    Not on file     Social History Narrative    No narrative on file       Current Outpatient Prescriptions:     Adapalene-Benzoyl Peroxide (EPIDUO) 0 1-2 5 % gel, Apply topically Daily, Disp: , Rfl:     Flax Oil-Fish Oil-Borage Oil ([employer] OMEGA-3) CAPS, Take by mouth, Disp: , Rfl:     ibuprofen (MOTRIN) 800 mg tablet, Take 1 tablet by mouth 3 (three) times a day as needed, Disp: , Rfl:     ketorolac (TORADOL) 10 mg tablet, Take by mouth every 8 (eight) hours, Disp: , Rfl:     Multiple Vitamin (MULTI-DAY VITAMINS) TABS, Take by mouth, Disp: , Rfl:   Allergies   Allergen Reactions    Mold Extract  [Trichophyton] Allergic Rhinitis    Sulfa Antibiotics     Amoxicillin Fever, Hives, Itching and Rash    Penicillins Fever, Hives, Itching and Rash       The following portions of the patient's history were reviewed and updated as appropriate: allergies, current medications, past family history, past medical history, past social history, past surgical history and problem list         Vitals:    02/08/18 1615   BP: 112/70   Pulse: 95   Resp: 16   Temp: 98 8 °F (37 1 °C)       Physical Exam   Constitutional: She is oriented to person, place, and time  She appears well-developed and well-nourished  HENT:   Head: Normocephalic and atraumatic  Pulmonary/Chest: Right breast exhibits no inverted nipple, no mass, no nipple discharge, no skin change and no tenderness  Left breast exhibits tenderness  Left breast exhibits no inverted nipple, no mass, no nipple discharge and no skin change         Bilateral fibrocystic changes with extremely dense glandular tissue left 12:00 with associated tenderness    Office ultrasound done today left breast upper central reveals extremely dense glandular tissue with scattered subcentimeter fibrocystic changes but no solid masses or areas of distortion   Lymphadenopathy:        Right axillary: No pectoral and no lateral adenopathy present  Left axillary: No pectoral and no lateral adenopathy present  Right: No supraclavicular adenopathy present  Left: No supraclavicular adenopathy present  Neurological: She is alert and oriented to person, place, and time  Psychiatric: She has a normal mood and affect  Results:      Imaging  08/15/2017 bilateral 3D diagnostic mammogram as well as bilateral ultrasound is benign BI-RADS two with category three density  Office ultrasound done today in the office as noted    I reviewed the above imaging data  Discussion/Summary:  28-year-old female with dense breast tissue and fibrocystic changes  I discussed the use of evening Primrose oil with her and recommended she take 3 g per day  She also informs me that her mother was recently diagnosed with pancreatic cancer  I gave her information to pass onto her mother for our genetic counselor  I will see Krista Purdy again in six months for another examination or sooner should the need arise

## 2018-05-24 ENCOUNTER — HOSPITAL ENCOUNTER (OUTPATIENT)
Dept: RADIOLOGY | Facility: HOSPITAL | Age: 33
Discharge: HOME | End: 2018-05-24
Payer: COMMERCIAL

## 2018-05-24 ENCOUNTER — OFFICE VISIT (OUTPATIENT)
Dept: INTERNAL MEDICINE CLINIC | Facility: CLINIC | Age: 33
End: 2018-05-24
Payer: COMMERCIAL

## 2018-05-24 ENCOUNTER — TRANSCRIBE ORDERS (OUTPATIENT)
Dept: RADIOLOGY | Facility: HOSPITAL | Age: 33
End: 2018-05-24

## 2018-05-24 VITALS
BODY MASS INDEX: 25.88 KG/M2 | DIASTOLIC BLOOD PRESSURE: 70 MMHG | OXYGEN SATURATION: 99 % | HEIGHT: 66 IN | HEART RATE: 96 BPM | WEIGHT: 161 LBS | SYSTOLIC BLOOD PRESSURE: 110 MMHG

## 2018-05-24 DIAGNOSIS — M54.2 CERVICALGIA: Primary | ICD-10-CM

## 2018-05-24 DIAGNOSIS — M54.2 CERVICALGIA: ICD-10-CM

## 2018-05-24 PROCEDURE — 72050 X-RAY EXAM NECK SPINE 4/5VWS: CPT

## 2018-05-24 PROCEDURE — 99213 OFFICE O/P EST LOW 20 MIN: CPT | Performed by: NURSE PRACTITIONER

## 2018-05-24 RX ORDER — SACCHAROMYCES BOULARDII 250 MG
250 CAPSULE ORAL DAILY
COMMUNITY
End: 2021-06-08 | Stop reason: ALTCHOICE

## 2018-05-24 RX ORDER — TIZANIDINE 4 MG/1
4 TABLET ORAL EVERY 8 HOURS PRN
Qty: 30 TABLET | Refills: 0 | Status: SHIPPED | OUTPATIENT
Start: 2018-05-24 | End: 2019-11-26 | Stop reason: ALTCHOICE

## 2018-05-24 RX ORDER — CHLORHEXIDINE GLUCONATE 0.12 MG/ML
RINSE ORAL
COMMUNITY
Start: 2018-03-12 | End: 2018-05-24

## 2018-05-24 NOTE — PROGRESS NOTES
Assessment/Plan:       Diagnoses and all orders for this visit:    Cervicalgia  -     tiZANidine (ZANAFLEX) 4 mg tablet; Take 1 tablet (4 mg total) by mouth every 8 (eight) hours as needed for muscle spasms  -     Ambulatory referral to Physical Therapy; Future  -     XR spine cervical complete 4 or 5 vw non injury; Future    Other orders  -     Discontinue: chlorhexidine (PERIDEX) 0 12 % solution;   -     saccharomyces boulardii (FLORASTOR) 250 mg capsule; Take 250 mg by mouth daily          Subjective:      Patient ID: Daniel Morfin is a 28 y o  female  Pt c/o neck pain, L sided, sometimes radiates to L shoulder  Describes pain as constant, sharp  Pt reports old injury  Neck Pain    This is a new problem  The problem occurs 2 to 4 times per day  The problem has been unchanged  The quality of the pain is described as aching and shooting  The pain is moderate  The following portions of the patient's history were reviewed and updated as appropriate: allergies, current medications, past family history, past medical history, past social history, past surgical history and problem list     Review of Systems   Constitutional: Negative  HENT: Negative  Eyes: Negative  Respiratory: Negative  Cardiovascular: Negative  Gastrointestinal: Negative  Musculoskeletal: Positive for neck pain  Neurological: Negative  Objective:      /70 (BP Location: Left arm, Patient Position: Sitting, Cuff Size: Adult)   Pulse 96   Ht 5' 6" (1 676 m)   Wt 73 kg (161 lb)   LMP 05/06/2018 (Approximate)   SpO2 99%   BMI 25 99 kg/m²          Physical Exam   Constitutional: She is oriented to person, place, and time  She appears well-developed and well-nourished  HENT:   Head: Normocephalic and atraumatic  Eyes: Conjunctivae are normal  Pupils are equal, round, and reactive to light  Neck: Normal range of motion  Neck supple  Cardiovascular: Normal rate and regular rhythm      Pulmonary/Chest: Effort normal and breath sounds normal    Abdominal: Soft  Bowel sounds are normal    Musculoskeletal: Normal range of motion  Neurological: She is alert and oriented to person, place, and time  Skin: Skin is warm and dry

## 2018-06-12 ENCOUNTER — EVALUATION (OUTPATIENT)
Dept: PHYSICAL THERAPY | Facility: CLINIC | Age: 33
End: 2018-06-12
Payer: COMMERCIAL

## 2018-06-12 DIAGNOSIS — M54.2 CERVICALGIA: Primary | ICD-10-CM

## 2018-06-12 PROCEDURE — 97162 PT EVAL MOD COMPLEX 30 MIN: CPT | Performed by: PHYSICAL THERAPIST

## 2018-06-12 PROCEDURE — 97110 THERAPEUTIC EXERCISES: CPT | Performed by: PHYSICAL THERAPIST

## 2018-06-12 PROCEDURE — G8979 MOBILITY GOAL STATUS: HCPCS | Performed by: PHYSICAL THERAPIST

## 2018-06-12 PROCEDURE — G8978 MOBILITY CURRENT STATUS: HCPCS | Performed by: PHYSICAL THERAPIST

## 2018-06-12 PROCEDURE — 97140 MANUAL THERAPY 1/> REGIONS: CPT | Performed by: PHYSICAL THERAPIST

## 2018-06-12 NOTE — PROGRESS NOTES
PT Evaluation     Today's date: 2018  Patient name: Milly Forde  : 1985  MRN: [id number]  Referring provider: BORIS Warren  Dx:   Encounter Diagnosis     ICD-10-CM    1  Cervicalgia M54 2 Ambulatory referral to Physical Therapy                  Assessment  Impairments: abnormal muscle firing, abnormal muscle tone, abnormal or restricted ROM, abnormal movement, impaired physical strength, lacks appropriate home exercise program, pain with function and poor posture     Assessment details: Patient is a 28 y o  female who presents with s/s consistent with muscular imbalance, neural tension, and postural dysfunction  She presents to PT with pain, muscle spasms/trigger points, decreased strength in L shoulder, and decreased tolerance to activity  Patient's social history is significant for 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, which contributes to her stress level and tension  Patient would benefit from a holistic approach with skilled PT services and education on stress relieving techniques to address these impairments, achieve goals, and to maximize function  Thank you for the referral   Understanding of Dx/Px/POC: excellent  Goals  STG  1  Patient will improve L shoulder ER strength to 4+/5 and improved posture in 2 weeks  2  Patient will report decreased pain levels, with max 3/10 during functional daily activities in 2 weeks  3  Patient will achieve painfree AROM of cervical spine in 2 weeks  LTG  1  Patient will demonstrate independence with HEP to maintain current level of function in 8 weeks  2  Patient will return to gym workouts with minimal modifications required to achieve personal goals in 8 weeks  3  Patient will improve FOTO score to 72 to demonstrate improved overall function in 8 weeks      Plan  Patient would benefit from: skilled physical therapy  Planned modality interventions: cryotherapy, H-Wave, high voltage pulsed current: spasm management, high voltage pulsed current: pain management, electrical stimulation/Russian stimulation, TENS, thermotherapy: hydrocollator packs, traction, ultrasound and unattended electrical stimulation  Planned therapy interventions: abdominal trunk stabilization, balance, body mechanics training, breathing training, flexibility, functional ROM exercises, home exercise program, joint mobilization, manual therapy, massage, Justice taping, muscle pump exercises, neuromuscular re-education, patient education, postural training, strengthening, stretching, therapeutic activities, therapeutic exercise and therapeutic training  Frequency: 2x week  Duration in visits: 8  Treatment plan discussed with: patient        Subjective Evaluation    History of Present Illness  Mechanism of injury: WORK:  at [employer] [facility] [address]: Mom, primary caregiver as her mother has CA  HOBBIES/EXERCISE: Walking, weight lifting, yoga  PLOF: Years ago, patient was pain free, but has become more guarded and modified activities due to L sided neck/shoulder pain  HISTORY OF CURRENT INJURY:  Triggering incident in 2010, when she had an injury during a crossfit workout  The pain has waxed and waned, and was addressed with PT in the past  Within the last 6 months, patient's mom was diagnosed with CA, and patient states this is when her pain began to increase again  PAIN LOCATION/DESCRIPTORS: Pain located in midback on L, back of neck on L, and into L shoulder blade  It can start midback, in the neck, or in the shoulder depending on the day    AGGRAVATING FACTORS: Exercise, lifting heavy objects, carrying a bag on her shoulder, doing dishes, pullups/psuhups, prolonged standing or sitting in a hunched position  EASES: massage, stretching, trigger point release, heat  DAY PATTERN: worse in the morning, especially if she sleeps a full night in one position  IMAGING:  X-ray unremarkable  PATIENT GOALS: Increase flexibility, increase strength, develop an exercise routine, decrease pain, and go back to pain free exercise  Recurrent probem    Pain  Current pain rating: 3  At best pain ratin  At worst pain ratin  Quality: burning, sharp, throbbing, radiating and needle-like    Treatments  Previous treatment: physical therapy  Current treatment: massage and medication        Objective     Postural Observations  Seated posture: fair    Additional Postural Observation Details  Patient demonstrates slight forward head and rounded shoulders, with minimal scapular winging bilaterally  Palpation     Additional Palpation Details  Palpation of bilateral levator scapulae, middle and upper trapezius reveal trigger points  Muscle spasm L erector spinae in thoracic area, with tingling and radiating pain into lower back upon palpation  Tenderness to palpation of TP of C3-C6 bilaterally, and SP of C3-C5      Neural Tension Tests: (+) Ulnar ULTT bilaterally, (+) Median ULTT bilaterally with earlier s/s on L  Compression (-)  Distraction (-)    Neurological Testing     Reflexes   Left   Biceps (C5/C6): normal (2+)  Brachioradialis (C6): normal (2+)  Triceps (C7): normal (2+)    Right   Biceps (C5/C6): normal (2+)  Brachioradialis (C6): normal (2+)  Triceps (C7): normal (2+)    Active Range of Motion   Cervical/Thoracic Spine   Cervical    Flexion: WFL and with pain  Extension: WFL  Left lateral flexion: WFL and with pain  Right lateral flexion: WFL  Left rotation: WFL and with pain  Right rotation: [facility]    Additional Active Range of Motion Details  Recreation of asteric sign with cervical sidebending L in combination with L rotation      Strength/Myotome Testing     Left Shoulder     Planes of Motion   External rotation at 90°: 4- (pain, unable to maintain static hold against resistance) and WFL     Isolated Muscles   Triceps: 4 (Subjective complaints of weakness and tingling) and WFL           Diagnosis: Postural dysfunction, neural tension, trigger points   Precautions: positive neural tension tests bilaterally   Manual Therapy 6/12       Cervical mobs 10 min       ULTT nerve glides NV                               Exercise Diary         UT stretch 3x30 sec       Levator stretch 3x30 sec       Doorway stretch unilateral 3x30 sec ea       Scapular retration 3x10 5 sec holds       Chin tucks supine NV       No moneys NV       Theraband ER/IR NV       Theraband rows NV       Self MFR NV       Triceps extensions NV       Thoracic ext over chair NV                                                                               Modalities        Heat + TENS NV

## 2018-06-14 ENCOUNTER — OFFICE VISIT (OUTPATIENT)
Dept: PHYSICAL THERAPY | Facility: CLINIC | Age: 33
End: 2018-06-14
Payer: COMMERCIAL

## 2018-06-14 DIAGNOSIS — M54.2 CERVICALGIA: Primary | ICD-10-CM

## 2018-06-14 PROCEDURE — 97140 MANUAL THERAPY 1/> REGIONS: CPT | Performed by: PHYSICAL THERAPIST

## 2018-06-14 PROCEDURE — 97110 THERAPEUTIC EXERCISES: CPT | Performed by: PHYSICAL THERAPIST

## 2018-06-14 NOTE — PROGRESS NOTES
Daily Note     Today's date: 2018  Patient name: Guy Hong  : 1985  MRN: [id number]  Referring provider: BORIS Kolb  Dx:   Encounter Diagnosis     ICD-10-CM    1  Cervicalgia M54 2                   Subjective: Patient reports that her stress level has increased as her mom was admitted to the hospital  She had complaints of some "dull, achy" pain in L mid/lower trap area  Patient notes she did not perform her exercises today  Objective: See treatment diary below  Diagnosis: Postural dysfunction, neural tension, trigger points   Precautions: positive neural tension tests bilaterally   Manual Therapy          Cervical mobs 10 min           ULTT nerve glides NV            Graston UT/levator/L erector spinae    10 min                                     Exercise Diary              UBE  8 min , 4 min forwards 4 min backwards      UT stretch 3x30 sec  3x30 sec         Levator stretch 3x30 sec  3x30 sec         Doorway stretch unilateral 3x30 sec ea  3x30 sec         Scapular retration 3x10 5 sec holds  30 x 5sec holds         Chin tucks supine NV           No moneys NV           Theraband ER/IR NV           Theraband rows NV           Self MFR UT/levator NV  With cane for 2 min each side         Triceps extensions NV           Thoracic ext over chair NV  4x15 seconds                                                                                                                                       Modalities             Heat + TENS NV  Heat 10 min                                           Assessment: Tolerated treatment well, with improved perception of pain and mobility after exercise  Patient exhibited good technique with therapeutic exercises, and tolerated additional exercises well  Performed HEP this date, and she was able to perform all exercises with minimal cuing required  Plan: Continue per plan of care    Patient responded well to soft tissue mobilization of UT/levator/erector spinae to address trigger points  Plan to assess rib position and perform rib mobs if appropriate next session

## 2018-06-19 ENCOUNTER — OFFICE VISIT (OUTPATIENT)
Dept: PHYSICAL THERAPY | Facility: CLINIC | Age: 33
End: 2018-06-19
Payer: COMMERCIAL

## 2018-06-19 DIAGNOSIS — M54.2 CERVICALGIA: Primary | ICD-10-CM

## 2018-06-19 PROCEDURE — 97110 THERAPEUTIC EXERCISES: CPT | Performed by: PHYSICAL THERAPIST

## 2018-06-19 PROCEDURE — 97140 MANUAL THERAPY 1/> REGIONS: CPT | Performed by: PHYSICAL THERAPIST

## 2018-06-19 NOTE — PROGRESS NOTES
Daily Note     Today's date: 2018  Patient name: Sophia Dowd  : 1985  MRN: [id number]  Referring provider: BORIS Flores  Dx:   Encounter Diagnosis     ICD-10-CM    1  Cervicalgia M54 2        Start Time:   Stop Time: 1500  Total time in clinic (min): 55 minutes    Subjective: Patient notes an improvement in body awareness, stating "your words pop into my head 'down and back!' and I am able to relax my shoulders into the correct position and I feel better " Patient rated pain only 2/10         Objective: See treatment diary below  Diagnosis: Postural dysfunction, neural tension, trigger points   Precautions: positive neural tension tests bilaterally   Manual Therapy        Cervical mobs 10 min           ULTT nerve glides NV           Graston UT/levator/L erector spinae    10 min  5 min       Rib rotational mobilization      5 min                     Exercise Diary              UBE  8 min , 4 min forwards 4 min backwards      UT stretch 3x30 sec  3x30 sec  3x30 sec       Levator stretch 3x30 sec  3x30 sec  3x30 sec       Doorway stretch unilateral 3x30 sec ea  3x30 sec         Scapular retration 3x10 5 sec holds  30 x 5sec holds         Chin tucks supine NV           No moneys NV           Theraband ER/IR NV           Theraband rows NV   Red TB 3x10, cuing for scapular retraction       Self MFR UT/levator NV  With cane for 2 min each side Cane, 2 min ea side       Triceps extensions NV           Thoracic ext over chair NV  4x15 seconds  4x15 sec                                                                                                                                     Modalities             Heat + TENS NV  Heat 10 min Heat 10 min                                       Assessment: Tolerated treatment well and very well, with improved postural awareness throughout treatment program  Patient exhibited good technique with therapeutic exercises, and tolerated advancements in exercise program well  Assessed rib position in sitting and supine and AROM of thoracic rotation, with limitations rotating to the R  Improved mobility with rib rotational mobilization  Pain rated 0/10 following treatment session  Plan: Progress treatment as tolerated  Plan to progress strengthening exercises in a upright posture as patient demonstrates ability to maintain correct alignment

## 2018-06-22 ENCOUNTER — TRANSCRIBE ORDERS (OUTPATIENT)
Dept: LAB | Facility: CLINIC | Age: 33
End: 2018-06-22

## 2018-06-22 ENCOUNTER — OFFICE VISIT (OUTPATIENT)
Dept: PHYSICAL THERAPY | Facility: CLINIC | Age: 33
End: 2018-06-22
Payer: COMMERCIAL

## 2018-06-22 ENCOUNTER — APPOINTMENT (OUTPATIENT)
Dept: LAB | Facility: CLINIC | Age: 33
End: 2018-06-22
Payer: COMMERCIAL

## 2018-06-22 DIAGNOSIS — Z00.8 HEALTH EXAMINATION IN POPULATION SURVEY: Primary | ICD-10-CM

## 2018-06-22 DIAGNOSIS — M54.2 CERVICALGIA: Primary | ICD-10-CM

## 2018-06-22 LAB
CHOLEST SERPL-MCNC: 116 MG/DL (ref 50–200)
EST. AVERAGE GLUCOSE BLD GHB EST-MCNC: 97 MG/DL
HBA1C MFR BLD: 5 % (ref 4.2–6.3)
HDLC SERPL-MCNC: 48 MG/DL (ref 40–60)
LDLC SERPL CALC-MCNC: 54 MG/DL (ref 0–100)
NONHDLC SERPL-MCNC: 68 MG/DL
TRIGL SERPL-MCNC: 70 MG/DL

## 2018-06-22 PROCEDURE — 97110 THERAPEUTIC EXERCISES: CPT | Performed by: PHYSICAL THERAPIST

## 2018-06-22 PROCEDURE — 97112 NEUROMUSCULAR REEDUCATION: CPT | Performed by: PHYSICAL THERAPIST

## 2018-06-22 PROCEDURE — 97140 MANUAL THERAPY 1/> REGIONS: CPT | Performed by: PHYSICAL THERAPIST

## 2018-06-22 PROCEDURE — 36415 COLL VENOUS BLD VENIPUNCTURE: CPT

## 2018-06-22 PROCEDURE — 80061 LIPID PANEL: CPT

## 2018-06-22 PROCEDURE — 83036 HEMOGLOBIN GLYCOSYLATED A1C: CPT

## 2018-06-22 NOTE — PROGRESS NOTES
Daily Note     Today's date: 2018  Patient name: Payton Rosas  : 1985  MRN: [id number]  Referring provider: BORIS Gibbs  Dx:   Encounter Diagnosis     ICD-10-CM    1  Cervicalgia M54 2        Start Time:   Stop Time: 151  Total time in clinic (min): 52 minutes    Subjective: Patient states she feels stressed, has not slept well, and is overwhelmed with the care-taking role for her mom  Patient is sore in her neck today, and blames the recent stresses   She states "I feel better about my posture and stronger scapular position!"      Objective: See treatment diary below  Diagnosis: Postural dysfunction, neural tension, trigger points   Precautions: positive neural tension tests bilaterally   Manual Therapy      Cervical mobs 10 min           ULTT nerve glides NV           Graston UT/levator/L erector spinae    10 min  5 min  10 min     Rib rotational mobilization      5 min                     Exercise Diary              UBE  8 min , 4 min forwards 4 min backwards  2 min forwards 2 min backwards    UT stretch 3x30 sec  3x30 sec  3x30 sec       Levator stretch 3x30 sec  3x30 sec  3x30 sec       Doorway stretch unilateral 3x30 sec ea  3x30 sec    3x30 sec     Scapular retraction 3x10 5 sec holds  30 x 5sec holds         Deep cervical flexors with stabilizer NV           No moneys NV      Red TB     Theraband ER/IR NV           Theraband rows NV   Red TB 3x10, cuing for scapular retraction  Red TB 3x10, cuing for scapular retraction     Self MFR UT/levator NV  With cane for 2 min each side Cane, 2 min ea side  Cane, 2 min ea side     Triceps extensions NV           Thoracic ext over chair NV  4x15 seconds  4x15 sec  x 10 with segmental flexion added      Supine on half roll thoracic spine        2 min with deep breathing      AROM thoracic spine SB and rotation in chair        x 10 each direction     Prone scapular program                                                                                                 Modalities             Heat + TENS NV  Heat 10 min Heat 10 min Heat 10 min         Assessment: Tolerated treatment well  She demonstrates improved posture during all exercises and awareness of her position in space  Patient exhibited good technique with therapeutic exercises and would benefit from continued PT  Added more exercises focusing on thoracic mobility which she tolerated well with improved symptoms  Plan: Continue per plan of care  Progress as able with scapular strengthening  Perform FOTO assessment next session

## 2018-06-26 ENCOUNTER — OFFICE VISIT (OUTPATIENT)
Dept: PHYSICAL THERAPY | Facility: CLINIC | Age: 33
End: 2018-06-26
Payer: COMMERCIAL

## 2018-06-26 DIAGNOSIS — M54.2 CERVICALGIA: Primary | ICD-10-CM

## 2018-06-26 PROCEDURE — G8978 MOBILITY CURRENT STATUS: HCPCS | Performed by: PHYSICAL THERAPIST

## 2018-06-26 PROCEDURE — 97140 MANUAL THERAPY 1/> REGIONS: CPT | Performed by: PHYSICAL THERAPIST

## 2018-06-26 PROCEDURE — 97112 NEUROMUSCULAR REEDUCATION: CPT | Performed by: PHYSICAL THERAPIST

## 2018-06-26 PROCEDURE — G8979 MOBILITY GOAL STATUS: HCPCS | Performed by: PHYSICAL THERAPIST

## 2018-06-26 PROCEDURE — 97110 THERAPEUTIC EXERCISES: CPT | Performed by: PHYSICAL THERAPIST

## 2018-06-26 NOTE — PROGRESS NOTES
Daily Note     Today's date: 2018  Patient name: Guy Hong  : 1985  MRN: [id number]  Referring provider: BORIS Kolb  Dx:   Encounter Diagnosis     ICD-10-CM    1  Cervicalgia M54 2        Start Time: 805  Stop Time:   Total time in clinic (min): 65 minutes    Subjective: Patient states she "feels okay" and it takes her a while in the am to loosen up the upper traps  She reports all exercises are going well at home         Objective: See treatment diary below  Diagnosis: Postural dysfunction, neural tension, trigger points   Precautions: positive neural tension tests bilaterally   Manual Therapy    Cervical mobs 10 min        10 min   ULTT nerve glides NV           Graston UT/levator/L erector spinae    10 min  5 min  10 min     Rib rotational mobilization      5 min                     Exercise Diary              UBE with scapular retraction  8 min , 4 min forwards 4 min backwards  2 min forwards 2 min backwards 8 min , 4 min forwards 4 min backwards   UT stretch 3x30 sec  3x30 sec  3x30 sec    2x30 sec   Levator stretch 3x30 sec  3x30 sec  3x30 sec    2x30 sec   Doorway stretch unilateral 3x30 sec ea  3x30 sec    3x30 sec HEP   Scapular retraction 3x10 5 sec holds  30 x 5sec holds     HEP   Deep cervical flexors with stabilizer NV           No moneys NV      Red TB 3x10  Red TB 3x10   Theraband ER/IR NV           Theraband rows NV   Red TB 3x10, cuing for scapular retraction  Red TB 3x10, cuing for scapular retraction Green TB 3x10, cuing for scapular retraction   Self MFR UT/levator NV  With cane for 2 min each side Cane, 2 min ea side  Cane, 2 min ea side Cane 2 min ea side   Triceps extensions NV           Thoracic ext over chair NV  4x15 seconds  4x15 sec  x 10 with segmental flexion added  x 10 segmental FLX/EXT    Supine on half roll thoracic spine        2 min with deep breathing  2 min c deep breathing    AROM thoracic spine SB and rotation in chair        x 10 each direction  x 10 ea   Prone scapular program          x10 ea AROM                                                                                       Modalities             Heat + TENS NV  Heat 10 min Heat 10 min Heat 10 min  Heat 10 min       Assessment: Tolerated treatment well  Patient exhibited good technique with therapeutic exercises and would benefit from continued PT  FOTO reassessed with improvement to 64  Patient displays improved postural awareness and decreased pain after therapy sessions  Performed cervical mobs today with C3/4 segment hypomobile  Plan: Continue per plan of care  Progress treatment as tolerated  Plan to perform cervical mobs again next session and continue progressing scapular stabilization exercises  Re-evaluation planned in three visits

## 2018-06-28 ENCOUNTER — OFFICE VISIT (OUTPATIENT)
Dept: PHYSICAL THERAPY | Facility: CLINIC | Age: 33
End: 2018-06-28
Payer: COMMERCIAL

## 2018-06-28 DIAGNOSIS — M54.2 CERVICALGIA: Primary | ICD-10-CM

## 2018-06-28 PROCEDURE — 97112 NEUROMUSCULAR REEDUCATION: CPT | Performed by: PHYSICAL THERAPIST

## 2018-06-28 PROCEDURE — 97110 THERAPEUTIC EXERCISES: CPT | Performed by: PHYSICAL THERAPIST

## 2018-06-28 NOTE — PROGRESS NOTES
Daily Note     Today's date: 2018  Patient name: Soila Tirado  : 1985  MRN: [id number]  Referring provider: BORIS Palacios  Dx:   Encounter Diagnosis     ICD-10-CM    1  Cervicalgia M54 2        Start Time:   Stop Time:   Total time in clinic (min): 41 minutes    Subjective: Patient reports her posture is stronger, she has more self awareness of her posture, and this alone has helped ease some of the tension in her neck  She feels she is improving and is happy with her progress        Objective: See treatment diary below  Diagnosis: Postural dysfunction, neural tension, trigger points   Precautions: positive neural tension tests bilaterally   Manual Therapy    Cervical mobs         10 min   ULTT nerve glides            Graston UT/levator/L erector spinae   10 min  5 min  10 min     Rib rotational mobilization     5 min                    Exercise Diary             UBE with scapular retraction 2 min forwards, 2 min backwards 8 min , 4 min forwards 4 min backwards  2 min forwards 2 min backwards 8 min , 4 min forwards 4 min backwards   UT stretch 2x30 sec  3x30 sec  3x30 sec    2x30 sec   Levator stretch 2x30 sec  3x30 sec  3x30 sec    2x30 sec   Doorway stretch unilateral 2x30 sec  3x30 sec    3x30 sec HEP   Scapular retraction   30 x 5sec holds     HEP   Deep cervical flexors with stabilizer            No moneys Red TB 3x10      Red TB 3x10  Red TB 3x10   Theraband ER/IR Red TB 2x10 ea bilaterally           Theraband rows Green TB 3x10 with scapular retraction   Red TB 3x10, cuing for scapular retraction  Red TB 3x10, cuing for scapular retraction Green TB 3x10, cuing for scapular retraction   Self MFR UT/levator TheraCane 2 min ea side  With cane for 2 min each side Cane, 2 min ea side  Cane, 2 min ea side Cane 2 min ea side   Triceps extensions From row position 2x10 2# weights           Thoracic ext over chair x10  4x15 seconds  4x15 sec  x 10 with segmental flexion added  x 10 segmental FLX/EXT    Supine on half roll thoracic spine 2 min c deep breathing      2 min with deep breathing  2 min c deep breathing    AROM thoracic spine SB and rotation in chair x10      x 10 each direction  x 10 ea   Prone scapular program X 10 ea bilaterally AROM        x10 ea AROM    Rows  2# 2x10                                                                            Modalities            Heat + TENS   Heat 10 min Heat 10 min Heat 10 min  Heat 10 min         Assessment: Patient late to session, and agreeable to shorted timeframe with focus on progression of exercises  She tolerated treatment well  Patient exhibited good technique with therapeutic exercises, would benefit from continued PT and is showing consistent improvement with progression of scapular stabilization exercises  No manual performed this date to assess patient symptoms and function without hands on techniques  She is showing progress towards her goals, with re-evaluation scheduled in two visits  Plan: Continue per plan of care  Progress treatment as tolerated

## 2018-07-03 ENCOUNTER — OFFICE VISIT (OUTPATIENT)
Dept: PHYSICAL THERAPY | Facility: CLINIC | Age: 33
End: 2018-07-03
Payer: COMMERCIAL

## 2018-07-03 DIAGNOSIS — M54.2 CERVICALGIA: Primary | ICD-10-CM

## 2018-07-03 PROCEDURE — 97110 THERAPEUTIC EXERCISES: CPT | Performed by: PHYSICAL THERAPIST

## 2018-07-03 PROCEDURE — 97112 NEUROMUSCULAR REEDUCATION: CPT | Performed by: PHYSICAL THERAPIST

## 2018-07-03 NOTE — PROGRESS NOTES
Daily Note     Today's date: 7/3/2018  Patient name: Ivis Curry  : 1985  MRN: [id number]  Referring provider: BORIS Cuenca  Dx:   Encounter Diagnosis     ICD-10-CM    1  Cervicalgia M54 2        Start Time:   Stop Time: 825  Total time in clinic (min): 50 minutes    Subjective: Patient reports she feels "pretty good" and she feels she is improving steadily  She has no pain today, and a just a little bit of stiffness   "I am definitely noticing an improvement in self awareness of my posture, and I feel that has made a huge difference "       Objective: See treatment diary below  Diagnosis: Postural dysfunction, neural tension, trigger points   Precautions: positive neural tension tests bilaterally   Manual Therapy 6/28 7/3  6/18  6/22  6/26   Cervical mobs        10 min   ULTT nerve glides           Graston UT/levator/L erector spinae    5 min  10 min     Rib rotational mobilization    5 min                   Exercise Diary            UBE with scapular retraction 2 min forwards, 2 min backwards 4 min forwards, 4 min backwards  2 min forwards 2 min backwards 8 min , 4 min forwards 4 min backwards   UT stretch 2x30 sec 2 x 30 sec  3x30 sec    2x30 sec   Levator stretch 2x30 sec 2 x 30 sec  3x30 sec    2x30 sec   Doorway stretch unilateral 2x30 sec 2 x 30 sec    3x30 sec HEP   Scapular retraction       HEP   Deep cervical flexors with stabilizer           No moneys Red TB 3x10 Red TB 3x10    Red TB 3x10  Red TB 3x10   Theraband ER/IR Red TB 2x10 ea bilaterally Red 2x15 ashlee         Theraband rows Green TB 3x10 with scapular retraction Green TB rows/ext 3x10 Red TB 3x10, cuing for scapular retraction  Red TB 3x10, cuing for scapular retraction Green TB 3x10, cuing for scapular retraction   Self MFR UT/levator TheraCane 2 min ea side  Cane, 2 min ea side  Cane, 2 min ea side Cane 2 min ea side   Triceps extensions From row position 2x10 2# weights          Thoracic ext over chair x10 x10  4x15 sec  x 10 with segmental flexion added  x 10 segmental FLX/EXT    Supine on half roll thoracic spine 2 min c deep breathing     2 min with deep breathing  2 min c deep breathing    AROM thoracic spine SB and rotation in chair x10 x10    x 10 each direction  x 10 ea   Prone scapular program X 10 ea bilaterally AROM X 10 ea bilaterally      x10 ea AROM    Rows  2# 2x10                                                                      Modalities           Heat + TENS  Heat 10 min Heat 10 min Heat 10 min  Heat 10 min       Assessment: Tolerated treatment well  Patient exhibited good technique with therapeutic exercises, would benefit from continued PT and displays greatly improved postural awareness and self correction with all exercises  Deferred manual therapy again today to gradually transition to home program for pain relief and improved mobility  Discussed HEP in detail this date to ensure proper form with performance, with patient demonstrating understanding and proper form on first attempt  Plan: Progress treatment as tolerated  Plan to perform re-evaluation next session, with discussion of POC to determine if PT will continue or she will DC with HEP  Patient currently feeling great and is a likely discharge next session

## 2018-07-10 ENCOUNTER — EVALUATION (OUTPATIENT)
Dept: PHYSICAL THERAPY | Facility: CLINIC | Age: 33
End: 2018-07-10
Payer: COMMERCIAL

## 2018-07-10 DIAGNOSIS — M54.2 CERVICALGIA: Primary | ICD-10-CM

## 2018-07-10 PROCEDURE — 97140 MANUAL THERAPY 1/> REGIONS: CPT | Performed by: PHYSICAL THERAPIST

## 2018-07-10 PROCEDURE — G8986 CARRY D/C STATUS: HCPCS | Performed by: PHYSICAL THERAPIST

## 2018-07-10 PROCEDURE — G8984 CARRY CURRENT STATUS: HCPCS | Performed by: PHYSICAL THERAPIST

## 2018-07-10 PROCEDURE — G8985 CARRY GOAL STATUS: HCPCS | Performed by: PHYSICAL THERAPIST

## 2018-07-10 PROCEDURE — 97112 NEUROMUSCULAR REEDUCATION: CPT | Performed by: PHYSICAL THERAPIST

## 2018-07-10 NOTE — PROGRESS NOTES
PT Re-Evaluation     Today's date: 7/10/2018  Patient name: Destinee Durham  : 1985  MRN: [id number]  Referring provider: BORIS Barakat  Dx:   Encounter Diagnosis     ICD-10-CM    1  Cervicalgia M54 2 PT plan of care cert/re-cert       Start Time: 5832  Stop Time: 0745  Total time in clinic (min): 40 minutes    Assessment  Impairments: abnormal muscle firing, abnormal muscle tone, abnormal or restricted ROM, abnormal movement, impaired physical strength, lacks appropriate home exercise program and pain with function    Assessment details: Patient is a 28 y o  female who presents for  re-evaluation of her cervical and shoulder pain  She has made significant improvements towards her goals, with improved ROM of cervical spine, improved posture, decreased pain, and improved function  She continues to have low levels of pain, maximum 4/10, tightness in UT, neural tension during ULTT bilaterally, and decreased strength in bilateral middle and lower traps  Patient's social history is significant for 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, which contributes to her stress level and tension  Patient would benefit from continued PT once a week for 4 weeks to address remaining impairments, achieve goals, and to maximize function with goal of discharge with HEP  Understanding of Dx/Px/POC: excellent  Goals  STG  1  Patient will improve L shoulder ER strength to 4+/5 and improved posture in 2 weeks  - partially met  2  Patient will report decreased pain levels, with max 3/10 during functional daily activities in 2 weeks  - partially met  3  Patient will achieve painfree AROM of cervical spine in 2 weeks  - partially met  LTG  1  Patient will demonstrate independence with HEP to maintain current level of function in 8 weeks  - met  2  Patient will return to gym workouts with minimal modifications required to achieve personal goals in 8 weeks  - partially met  3   Patient will improve FOTO score to 72 to demonstrate improved overall function in 8 weeks  - partially met    Plan  Patient would benefit from: skilled physical therapy  Planned modality interventions: cryotherapy, H-Wave, high voltage pulsed current: spasm management, high voltage pulsed current: pain management, electrical stimulation/Russian stimulation, TENS, thermotherapy: hydrocollator packs, ultrasound and unattended electrical stimulation  Planned therapy interventions: abdominal trunk stabilization, balance, body mechanics training, breathing training, flexibility, functional ROM exercises, home exercise program, joint mobilization, manual therapy, massage, Justice taping, muscle pump exercises, neuromuscular re-education, patient education, postural training, strengthening, stretching, therapeutic activities, therapeutic exercise and therapeutic training  Frequency: 1x week  Duration in weeks: 4  Treatment plan discussed with: patient        Subjective Evaluation    History of Present Illness  Mechanism of injury: WORK:  at [address] [place] [address] Dr: Mom, primary caregiver as her mother has CA  HOBBIES/EXERCISE: Walking, weight lifting, yoga  PLOF: Years ago, patient was pain free, but has become more guarded and modified activities due to L sided neck/shoulder pain    Re-eval:  HISTORY OF CURRENT INJURY:  Patient feels that her neck is improving significantly, she notices a definite improvement in strength, posture, and flexibility  PAIN LOCATION/DESCRIPTORS: Patient has some intermittent pain in her L UT and scapula that she is able to manage with stretching and changing her posture  AGGRAVATING FACTORS: Not performing exercises, prolonged inactivity, stress  EASES: massage, stretching, trigger point release, heat  DAY PATTERN: no true pattern remains  IMAGING:  X-ray unremarkable  PATIENT GOALS: Increase flexibility, increase strength, develop an exercise routine, decrease pain, and go back to pain free exercise   - patient feels she is on the right path to achieve these goals  Recurrent probem    Pain  Current pain ratin  At best pain ratin  At worst pain ratin  Quality: tight, dull ache and radiating    Treatments  Previous treatment: physical therapy  Current treatment: massage and physical therapy        Objective     Postural Observations  Seated posture: good  Standing posture: good    Additional Postural Observation Details  Patient sits upright with scapular retraction and plum line alignment    Palpation     Additional Palpation Details  Palpation of L UT reveals a trigger point  Tenderness to palpation of SP of C2 and TP of C5-C6   Tenderness to mobs of bilateral first ribs    Neural Tension Tests: (+) Ulnar ULTT bilaterally, (+) Median ULTT bilaterally   Compression (-)  Distraction (-)    Neurological Testing     Reflexes   Left   Biceps (C5/C6): normal (2+)  Brachioradialis (C6): normal (2+)  Triceps (C7): normal (2+)    Right   Biceps (C5/C6): normal (2+)  Brachioradialis (C6): normal (2+)  Triceps (C7): normal (2+)    Active Range of Motion   Cervical/Thoracic Spine   Cervical    Flexion: WFL  Extension: WFL  Left lateral flexion: WFL  Right lateral flexion: WFL  Left rotation: WFL  Right rotation: [facility]    Additional Active Range of Motion Details  No remaining pain with movements of the cervical spine, just tightness during side bending both directions    Strength/Myotome Testing     Left Shoulder     Planes of Motion   Flexion: 4+   Abduction: 4+   External rotation at 90°: 4 (no pain) and WFL     Isolated Muscles   Lower trapezius: 3+   Middle trapezius: 4-   Triceps: 4 (Subjective complaints of weakness and tingling) and WFL     Right Shoulder     Planes of Motion   Flexion: 4+   Abduction: 4+     Isolated Muscles   Lower trapezius: 3+   Middle trapezius: 4-     Diagnosis: Postural dysfunction, neural tension, trigger points   Precautions: positive neural tension tests bilaterally   Manual Therapy 6/28 7/3 7/10  6/22  6/26   Cervical mobs   2 min    10 min   ULTT nerve glides   5 min       Graston UT/levator/L erector spinae     10 min     Rib mobilization   5 min                  Exercise Diary           UBE with scapular retraction 2 min forwards, 2 min backwards 4 min forwards, 4 min backwards  2 min forwards 2 min backwards 8 min , 4 min forwards 4 min backwards   UT stretch 2x30 sec 2 x 30 sec     2x30 sec   Levator stretch 2x30 sec 2 x 30 sec     2x30 sec   Doorway stretch unilateral 2x30 sec 2 x 30 sec   3x30 sec HEP   Scapular retraction      HEP   Deep cervical flexors with stabilizer          No moneys Red TB 3x10 Red TB 3x10   Red TB 3x10  Red TB 3x10   Theraband ER/IR Red TB 2x10 ea bilaterally Red 2x15 ashlee        Theraband rows Green TB 3x10 with scapular retraction Green TB rows/ext 3x10   Red TB 3x10, cuing for scapular retraction Green TB 3x10, cuing for scapular retraction   Self MFR UT/levator TheraCane 2 min ea side    Cane, 2 min ea side Cane 2 min ea side   Triceps extensions From row position 2x10 2# weights         Thoracic ext over chair x10 x10   x 10 with segmental flexion added  x 10 segmental FLX/EXT    Supine on half roll thoracic spine 2 min c deep breathing    2 min with deep breathing  2 min c deep breathing    AROM thoracic spine SB and rotation in chair x10 x10   x 10 each direction  x 10 ea   Prone scapular program X 10 ea bilaterally AROM X 10 ea bilaterally     x10 ea AROM    Rows  2# 2x10          Self- ulnar nerve glide in ULTT position   3x10                   Re-evaluation   Performed                              Modalities          Heat + TENS  Heat 10 min  Heat 10 min  Heat 10 min

## 2018-07-12 ENCOUNTER — APPOINTMENT (OUTPATIENT)
Dept: PHYSICAL THERAPY | Facility: CLINIC | Age: 33
End: 2018-07-12
Payer: COMMERCIAL

## 2018-07-19 ENCOUNTER — APPOINTMENT (OUTPATIENT)
Dept: PHYSICAL THERAPY | Facility: CLINIC | Age: 33
End: 2018-07-19
Payer: COMMERCIAL

## 2018-07-24 ENCOUNTER — OFFICE VISIT (OUTPATIENT)
Dept: PHYSICAL THERAPY | Facility: CLINIC | Age: 33
End: 2018-07-24
Payer: COMMERCIAL

## 2018-07-24 DIAGNOSIS — M54.2 CERVICALGIA: Primary | ICD-10-CM

## 2018-07-24 PROCEDURE — 97110 THERAPEUTIC EXERCISES: CPT | Performed by: PHYSICAL THERAPIST

## 2018-07-24 PROCEDURE — 97112 NEUROMUSCULAR REEDUCATION: CPT | Performed by: PHYSICAL THERAPIST

## 2018-07-24 NOTE — PROGRESS NOTES
Daily Note     Today's date: 2018  Patient name: Daniel Morfin  : 1985  MRN: [id number]  Referring provider: BORIS Burnham  Dx:   Encounter Diagnosis     ICD-10-CM    1  Cervicalgia M54 2        Start Time: 78  Stop Time:   Total time in clinic (min): 59 minutes    Subjective: Patient notes that she is doing well, but her mother is back in the hospital and is not doing well, causing more stress   Patient states "I'm exhausted" and "I have not been diligent with the exercises "       Objective: See treatment diary below  Diagnosis: Postural dysfunction, neural tension, trigger points   Precautions: positive neural tension tests bilaterally   Manual Therapy 6/28 7/3 7/10 7/24  6/26   Cervical mobs   2 min   10 min   ULTT nerve glides   5 min      Graston UT/levator/L erector spinae         Rib mobilization   5 min                Exercise Diary          UBE with scapular retraction 2 min forwards, 2 min backwards 4 min forwards, 4 min backwards  4 min forwards, 4 min backwards 8 min , 4 min forwards 4 min backwards   UT stretch 2x30 sec 2 x 30 sec  HEP  2x30 sec   Levator stretch 2x30 sec 2 x 30 sec  HEP  2x30 sec   Doorway stretch unilateral 2x30 sec 2 x 30 sec  HEP HEP   Scapular retraction     HEP   Deep cervical flexors with stabilizer         No moneys Red TB 3x10 Red TB 3x10    Red TB 3x10   Theraband ER/IR Red TB 2x10 ea bilaterally Red 2x15 ashlee  Green x10 ea     Theraband rows Green TB 3x10 with scapular retraction Green TB rows/ext 3x10  Blue 3x10 Green TB 3x10, cuing for scapular retraction   Self MFR UT/levator TheraCane 2 min ea side    Cane 2 min ea side   Triceps extensions From row position 2x10 2# weights        Thoracic ext over chair x10 x10  X 10  x 10 segmental FLX/EXT    Supine on half roll thoracic spine 2 min c deep breathing   2 min deep breathing  2 min c deep breathing    AROM thoracic spine SB and rotation in chair x10 x10  X 10 ea  x 10 ea   Prone scapular program X 10 ea bilaterally AROM X 10 ea bilaterally  X 15 ea bilaterally 2#   x10 ea AROM    Rows  2# 2x10         Self- ulnar nerve glide in ULTT position   3x10 3x10 with roll in thoracic spine                Re-evaluation   Performed                           Modalities         Heat + TENS  Heat 10 min  Heat 10 min  Heat 10 min       Assessment: Tolerated treatment well  Patient exhibited good technique with therapeutic exercises and would benefit from continued PT  As patient is currently having difficulty finding time to perform current HEP due to her family emergency, no changes were made today  She tolerated exercises well, with focus today on postural musculature and nerve gliding  Plan: Continue per plan of care

## 2018-08-16 ENCOUNTER — OFFICE VISIT (OUTPATIENT)
Dept: INTERNAL MEDICINE CLINIC | Facility: CLINIC | Age: 33
End: 2018-08-16
Payer: COMMERCIAL

## 2018-08-16 VITALS
OXYGEN SATURATION: 99 % | HEIGHT: 66 IN | DIASTOLIC BLOOD PRESSURE: 84 MMHG | WEIGHT: 151 LBS | SYSTOLIC BLOOD PRESSURE: 128 MMHG | HEART RATE: 82 BPM | BODY MASS INDEX: 24.27 KG/M2

## 2018-08-16 DIAGNOSIS — F32.A ANXIETY AND DEPRESSION: Primary | ICD-10-CM

## 2018-08-16 DIAGNOSIS — F41.9 ANXIETY AND DEPRESSION: Primary | ICD-10-CM

## 2018-08-16 PROCEDURE — 99214 OFFICE O/P EST MOD 30 MIN: CPT | Performed by: NURSE PRACTITIONER

## 2018-08-16 PROCEDURE — 3008F BODY MASS INDEX DOCD: CPT | Performed by: NURSE PRACTITIONER

## 2018-08-16 NOTE — LETTER
August 16, 2018     Patient: Brandon Miller   YOB: 1985   Date of Visit: 8/16/2018       To Whom it May Concern:    Please excuse Brandon Miller from work for the week of august 20th due to the sudden death of her mother and as needed  She may need to take additional days off as well  We are treating her during this grieving process        Sincerely,          BORIS Thrasher        CC: No Recipients

## 2018-08-16 NOTE — PROGRESS NOTES
Assessment/Plan:    No problem-specific Assessment & Plan notes found for this encounter  Diagnoses and all orders for this visit:    Anxiety and depression      patient declined medication at this time  Will continue therapy    Subjective:      Patient ID: Cindy Acevedo is a 28 y o  female  Pt's mother passed away 2 weeks ago  She is very anxious/depressed    Needs a note to take off from work  She is a  at [employer]  [employer]    She sees a counseler        The following portions of the patient's history were reviewed and updated as appropriate: allergies, current medications, past family history, past medical history, past social history, past surgical history and problem list     Review of Systems   Constitutional: Negative  HENT: Negative  Eyes: Negative  Respiratory: Negative  Cardiovascular: Negative  Gastrointestinal: Negative  Musculoskeletal: Negative  Neurological: Negative  Psychiatric/Behavioral: The patient is nervous/anxious  Family History   Problem Relation Age of Onset    Prostate cancer Maternal Grandfather [de-identified]    Lung cancer Paternal Grandmother 72    Psychiatric Illness Family     Pancreatic cancer Mother 61    Asthma Mother     Hypertension Father      Past Medical History:   Diagnosis Date    PCOS (polycystic ovarian syndrome)     Upper back pain     Wears glasses      Social History     Social History    Marital status: Single     Spouse name: N/A    Number of children: N/A    Years of education: N/A     Occupational History    Not on file       Social History Main Topics    Smoking status: Never Smoker    Smokeless tobacco: Never Used    Alcohol use No    Drug use: No    Sexual activity: Not on file     Other Topics Concern    Not on file     Social History Narrative    No narrative on file       Current Outpatient Prescriptions:     ibuprofen (MOTRIN) 800 mg tablet, Take 1 tablet by mouth 3 (three) times a day as needed, Disp: , Rfl:     Multiple Vitamin (MULTI-DAY VITAMINS) TABS, Take by mouth, Disp: , Rfl:     saccharomyces boulardii (FLORASTOR) 250 mg capsule, Take 250 mg by mouth daily, Disp: , Rfl:     tiZANidine (ZANAFLEX) 4 mg tablet, Take 1 tablet (4 mg total) by mouth every 8 (eight) hours as needed for muscle spasms, Disp: 30 tablet, Rfl: 0  Allergies   Allergen Reactions    Mold Extract  [Trichophyton] Allergic Rhinitis    Sulfa Antibiotics     Amoxicillin Fever, Hives, Itching and Rash    Penicillins Fever, Hives, Itching and Rash           Objective:      /84 (BP Location: Left arm, Patient Position: Sitting, Cuff Size: Adult)   Pulse 82   Ht 5' 6" (1 676 m)   Wt 68 5 kg (151 lb)   LMP 08/04/2018 (Exact Date)   SpO2 99%   BMI 24 37 kg/m²          Physical Exam   Constitutional: She is oriented to person, place, and time  She appears well-developed and well-nourished  HENT:   Head: Normocephalic and atraumatic  Eyes: Conjunctivae are normal  Pupils are equal, round, and reactive to light  Neck: Normal range of motion  Neck supple  Cardiovascular: Normal rate and regular rhythm  Pulmonary/Chest: Effort normal and breath sounds normal    Abdominal: Soft  Bowel sounds are normal    Musculoskeletal: Normal range of motion  Neurological: She is alert and oriented to person, place, and time  Skin: Skin is warm and dry

## 2018-08-23 ENCOUNTER — OFFICE VISIT (OUTPATIENT)
Dept: SURGICAL ONCOLOGY | Facility: CLINIC | Age: 33
End: 2018-08-23
Payer: COMMERCIAL

## 2018-08-23 VITALS
SYSTOLIC BLOOD PRESSURE: 120 MMHG | HEIGHT: 66 IN | RESPIRATION RATE: 16 BRPM | TEMPERATURE: 98.4 F | DIASTOLIC BLOOD PRESSURE: 78 MMHG | BODY MASS INDEX: 24.59 KG/M2 | HEART RATE: 83 BPM | WEIGHT: 153 LBS

## 2018-08-23 DIAGNOSIS — N60.11 FIBROCYSTIC BREAST CHANGES OF BOTH BREASTS: Primary | ICD-10-CM

## 2018-08-23 DIAGNOSIS — R92.2 DENSE BREAST TISSUE: ICD-10-CM

## 2018-08-23 DIAGNOSIS — N60.12 FIBROCYSTIC BREAST CHANGES OF BOTH BREASTS: Primary | ICD-10-CM

## 2018-08-23 PROCEDURE — 99213 OFFICE O/P EST LOW 20 MIN: CPT | Performed by: SURGERY

## 2018-08-23 NOTE — PROGRESS NOTES
Surgical Oncology Follow Up       [address] [facility],[address] [facility]  [facility] [address] [address]    Khushi Bookbinder  1985  [id number]  [address] [place] [address],[address] [place] [address]  [facility] [address] [address]    Chief Complaint   Patient presents with    Breast Problem     Pt is here for 6 month follow up       Assessment/Plan   Diagnoses and all orders for this visit:    Fibrocystic breast changes of both breasts    Dense breast tissue        Advance Care Planning/Advance Directives:  Did not discuss  with the patient  Oncology History:     No history exists  History of Present Illness: follow up   -Interval History: none    Review of Systems:  Review of Systems   Constitutional: Negative  Negative for appetite change and fever  Eyes: Negative  Respiratory: Negative for shortness of breath  Cardiovascular: Negative  Gastrointestinal: Negative  Endocrine: Negative  Genitourinary: Negative  Musculoskeletal: Negative  Negative for arthralgias and myalgias  Skin: Negative  Allergic/Immunologic: Negative  Neurological: Negative  Hematological: Negative  Negative for adenopathy  Does not bruise/bleed easily  Psychiatric/Behavioral: Negative          Patient Active Problem List   Diagnosis    Fibrocystic breast changes of both breasts    Dense breast tissue     Past Medical History:   Diagnosis Date    PCOS (polycystic ovarian syndrome)     Upper back pain     Wears glasses      Past Surgical History:   Procedure Laterality Date    KNEE SURGERY      Skin Debridement    TOOTH EXTRACTION       Family History   Problem Relation Age of Onset    Prostate cancer Maternal Grandfather [de-identified]    Lung cancer Paternal Grandmother 72    Psychiatric Illness Family     Pancreatic cancer Mother 61    Asthma Mother     Hypertension Father      Social History     Social History    Marital status: Single     Spouse name: N/A    Number of children: N/A    Years of education: N/A     Occupational History    Not on file  Social History Main Topics    Smoking status: Never Smoker    Smokeless tobacco: Never Used    Alcohol use No    Drug use: No    Sexual activity: Not on file     Other Topics Concern    Not on file     Social History Narrative    No narrative on file       Current Outpatient Prescriptions:     ibuprofen (MOTRIN) 800 mg tablet, Take 1 tablet by mouth 3 (three) times a day as needed, Disp: , Rfl:     Multiple Vitamin (MULTI-DAY VITAMINS) TABS, Take by mouth, Disp: , Rfl:     saccharomyces boulardii (FLORASTOR) 250 mg capsule, Take 250 mg by mouth daily, Disp: , Rfl:     tiZANidine (ZANAFLEX) 4 mg tablet, Take 1 tablet (4 mg total) by mouth every 8 (eight) hours as needed for muscle spasms, Disp: 30 tablet, Rfl: 0  Allergies   Allergen Reactions    Mold Extract  [Trichophyton] Allergic Rhinitis    Sulfa Antibiotics     Amoxicillin Fever, Hives, Itching and Rash    Penicillins Fever, Hives, Itching and Rash       The following portions of the patient's history were reviewed and updated as appropriate: allergies, current medications, past family history, past medical history, past social history, past surgical history and problem list         Vitals:    08/23/18 0849   BP: 120/78   Pulse: 83   Resp: 16   Temp: 98 4 °F (36 9 °C)       Physical Exam   Constitutional: She is oriented to person, place, and time  She appears well-developed and well-nourished  HENT:   Head: Normocephalic and atraumatic  Pulmonary/Chest: Right breast exhibits no inverted nipple, no mass, no nipple discharge, no skin change and no tenderness  Left breast exhibits no inverted nipple, no mass, no nipple discharge, no skin change and no tenderness     Bilateral fibrocystic changes with the left side being slightly more prominent than the right but no discrete masses appreciated Lymphadenopathy:        Right axillary: No pectoral and no lateral adenopathy present  Left axillary: No pectoral and no lateral adenopathy present  Right: No supraclavicular adenopathy present  Left: No supraclavicular adenopathy present  Neurological: She is alert and oriented to person, place, and time  Psychiatric: She has a normal mood and affect  Results:  Labs:  Reports that her mother had genetic testing and it was negative        Discussion/Summary:  27-year-old female with dense breast tissue and fibrocystic changes of the breast   She is feeling better on the evening Primrose oil  She states that her mother had genetic testing which was negative  Unfortunately her mother passed away three weeks ago  There are no concerns on examination today  I will plan to see  Roxann Salgado on an annual basis or sooner should the need arise

## 2018-08-24 ENCOUNTER — TELEPHONE (OUTPATIENT)
Dept: INTERNAL MEDICINE CLINIC | Facility: CLINIC | Age: 33
End: 2018-08-24

## 2018-08-24 NOTE — PROGRESS NOTES
PT Discharge    Today's date: 2018  Patient name: Nisha Humphries  : 1985  MRN: [id number]  Referring provider: BORIS Crow  Dx:   Encounter Diagnosis     ICD-10-CM    1  Cervicalgia M54 2        Nisha Humphries has been compliant with attending physical therapy and completing home exercise program since initial evaluation  Antonio Ford  has made improvements in objective data since initial evalulation and has achieved all goals  Patient has not been seen for PT since July because her mother passed away, and reports having returned to their prior level of function  Patient provided with updated Home Exercise Program last session, all questions were answered over the phone, she verbalized understanding, agreeing to plan of care   Thus it was mutually decided to discontinue this episode of care and transition to Home Exercise Program           Start Time:   Stop Time: 1610  Total time in clinic (min): 59 minutes    Assessment/Plan     Subjective    Objective    Flowsheet Rows      Most Recent Value   PT/OT G-Codes   Current Score  77   Projected Score  72   FOTO information reviewed  Yes   Assessment Type  Discharge   G code set  Carrying, Moving & Handling Objects   Carrying, Moving and Handling Objects Goal Status ()  [address] [place] [address]   Carrying, Moving and Handling Objects Discharge Status ()  Natty Patel

## 2018-08-24 NOTE — LETTER
August 24, 2018       Patient: Meg Lindquist   YOB: 1985   Date of Visit: 8/24/2018       Please excuse patient from work on 8/27/18 and 8/28/18 due to the sudden death of her mother  Thank you         Sincerely,        BORIS Dutton        CC: No Recipients

## 2018-08-28 ENCOUNTER — ANNUAL EXAM (OUTPATIENT)
Dept: OBGYN CLINIC | Facility: CLINIC | Age: 33
End: 2018-08-28
Payer: COMMERCIAL

## 2018-08-28 VITALS
BODY MASS INDEX: 24.75 KG/M2 | HEIGHT: 66 IN | WEIGHT: 154 LBS | SYSTOLIC BLOOD PRESSURE: 122 MMHG | DIASTOLIC BLOOD PRESSURE: 78 MMHG

## 2018-08-28 DIAGNOSIS — Z01.419 ENCOUNTER FOR ANNUAL ROUTINE GYNECOLOGICAL EXAMINATION: Primary | ICD-10-CM

## 2018-08-28 PROCEDURE — G0145 SCR C/V CYTO,THINLAYER,RESCR: HCPCS | Performed by: OBSTETRICS & GYNECOLOGY

## 2018-08-28 PROCEDURE — 87624 HPV HI-RISK TYP POOLED RSLT: CPT | Performed by: OBSTETRICS & GYNECOLOGY

## 2018-08-28 PROCEDURE — 99395 PREV VISIT EST AGE 18-39: CPT | Performed by: OBSTETRICS & GYNECOLOGY

## 2018-08-28 NOTE — PROGRESS NOTES
Assessment/Plan:    Pap done as well as annual   Encouraged self-breast examination as well as calcium supplementation  Patient will continue to follow-up with her therapist as scheduled  She will continue to follow-up with her breast surgeon as scheduled  Return to office in 1 year  No problem-specific Assessment & Plan notes found for this encounter  Diagnoses and all orders for this visit:    Encounter for annual routine gynecological examination  -     Liquid-based pap, screening          Subjective:      Patient ID: Meg Lindquist is a 28 y o  female  HPI     This is a 27-year-old female G0 who presents for her annual gyn exam   Patient has been under a lot of stress over the last couple of weeks  Her mother had passed away at the age of 58 with complications of pancreatic cancer  Her mother underwent a Whipple procedure 03/2018 for early stage and unfortunately had intraoperative complications which ended in multi organ failure  She is grieving the loss of her mother  Her mother tested negative for genetic screening  She does have a support system  She has been living with her mother for the last 2 years and has been very close to her  Patient did take a medical leave of absence for 4 weeks  She does plan on going back to work tomorrow  She has been seeing a therapist for the last 10 years and continues to see her on a regular basis  Patient's menstrual cycles are regular ever 26 days lasting 5-6 days with no breakthrough bleeding  She denies any changes in bowel or bladder function  Patient was diagnosed with PCO through Endocrinology several years ago  Past treatments include OCPs, metformin, spironolactone  She has not seen her endocrinologist or dermatologists in several years  Patient has not been sexually active over the last 6 years  She states her menstrual cycles have been more regular now than ever    She continues to see her breast surgeon for dense fibrocystic breast  She did have a mammogram last year which was normal     The following portions of the patient's history were reviewed and updated as appropriate: allergies, current medications, past family history, past medical history, past social history, past surgical history and problem list     Review of Systems   Constitutional: Negative for fatigue, fever and unexpected weight change  Respiratory: Negative for cough, chest tightness, shortness of breath and wheezing  Cardiovascular: Negative  Negative for chest pain and palpitations  Gastrointestinal: Negative  Negative for abdominal distention, abdominal pain, blood in stool, constipation, diarrhea, nausea and vomiting  Genitourinary: Negative  Negative for difficulty urinating, dyspareunia, dysuria, flank pain, frequency, genital sores, hematuria, pelvic pain, urgency, vaginal bleeding, vaginal discharge and vaginal pain  Skin: Negative for rash  Objective:      /78   Ht 5' 6" (1 676 m)   Wt 69 9 kg (154 lb)   LMP 08/04/2018 (Exact Date)   Breastfeeding? No   BMI 24 86 kg/m²          Physical Exam   Constitutional: She appears well-developed and well-nourished  Cardiovascular: Normal rate and regular rhythm  Pulmonary/Chest: Effort normal and breath sounds normal  Right breast exhibits no inverted nipple, no mass, no nipple discharge, no skin change and no tenderness  Left breast exhibits no inverted nipple, no mass, no nipple discharge, no skin change and no tenderness  Abdominal: Soft  Bowel sounds are normal  She exhibits no distension  There is no tenderness  There is no rebound and no guarding  Genitourinary: Vagina normal and uterus normal  There is no lesion on the right labia  There is no lesion on the left labia  Cervix exhibits no discharge and no friability  Right adnexum displays no mass, no tenderness and no fullness  Left adnexum displays no mass, no tenderness and no fullness  No vaginal discharge found

## 2018-08-30 LAB
HPV HR 12 DNA CVX QL NAA+PROBE: NEGATIVE
HPV16 DNA CVX QL NAA+PROBE: NEGATIVE
HPV18 DNA CVX QL NAA+PROBE: NEGATIVE

## 2018-08-31 LAB
LAB AP GYN PRIMARY INTERPRETATION: NORMAL
LAB AP LMP: NORMAL
Lab: NORMAL

## 2019-08-29 ENCOUNTER — ANNUAL EXAM (OUTPATIENT)
Dept: OBGYN CLINIC | Facility: CLINIC | Age: 34
End: 2019-08-29
Payer: COMMERCIAL

## 2019-08-29 VITALS — SYSTOLIC BLOOD PRESSURE: 118 MMHG | WEIGHT: 160.2 LBS | BODY MASS INDEX: 25.86 KG/M2 | DIASTOLIC BLOOD PRESSURE: 80 MMHG

## 2019-08-29 DIAGNOSIS — Z01.419 ENCOUNTER FOR ANNUAL ROUTINE GYNECOLOGICAL EXAMINATION: Primary | ICD-10-CM

## 2019-08-29 DIAGNOSIS — N94.6 DYSMENORRHEA: ICD-10-CM

## 2019-08-29 PROCEDURE — 99395 PREV VISIT EST AGE 18-39: CPT | Performed by: OBSTETRICS & GYNECOLOGY

## 2019-08-29 RX ORDER — IBUPROFEN 800 MG/1
800 TABLET ORAL EVERY 8 HOURS PRN
Qty: 60 TABLET | Refills: 1 | Status: SHIPPED | OUTPATIENT
Start: 2019-08-29 | End: 2021-12-02 | Stop reason: SDUPTHER

## 2019-08-29 NOTE — PROGRESS NOTES
Assessment/Plan:  Pap smear deferred due to low risk status  Encouraged self-breast examination as well as calcium supplementation  She will continue to follow-up with her breast surgeon as scheduled next month  She will continue to follow-up with her therapist on a regular basis  She is requesting refill 800 milligrams of Motrin p r n  dysmenorrhea  Return to office in 1 year or p r n  No problem-specific Assessment & Plan notes found for this encounter  Diagnoses and all orders for this visit:    Encounter for annual routine gynecological examination    Dysmenorrhea  -     ibuprofen (MOTRIN) 800 mg tablet; Take 1 tablet (800 mg total) by mouth every 8 (eight) hours as needed for moderate pain          Subjective:      Patient ID: Brandon Miller is a 35 y o  female  HPI     This is a 59-year-old female G0 who presents for annual gyn exam   Patient has a history of dysmenorrhea where she uses Motrin on a monthly basis with improvement  Her cycles are regular approximately every 4 weeks lasting 5-6 days with no breakthrough bleeding  She denies any changes in bowel or bladder function  Patient has not been sexually active in the last 9 years  She has been seeing a breast surgeon once a year for history of fibrocystic breast disease  She continues to follow-up with her therapist 2 times per month  She has been seeing this therapist for the last 10 years  She continues to grieve the loss of her mother and has been coping very well  She had passed away 03/2018 for intraoperative complications from a Whipple procedure, pancreatic cancer  The following portions of the patient's history were reviewed and updated as appropriate: allergies, current medications, past family history, past medical history, past social history, past surgical history and problem list     Review of Systems   Constitutional: Negative for fatigue, fever and unexpected weight change     Respiratory: Negative for cough, chest tightness, shortness of breath and wheezing  Cardiovascular: Negative  Negative for chest pain and palpitations  Gastrointestinal: Negative  Negative for abdominal distention, abdominal pain, blood in stool, constipation, diarrhea, nausea and vomiting  Genitourinary: Negative  Negative for difficulty urinating, dyspareunia, dysuria, flank pain, frequency, genital sores, hematuria, pelvic pain, urgency, vaginal bleeding, vaginal discharge and vaginal pain  Skin: Negative for rash  Objective:      /80   Wt 72 7 kg (160 lb 3 2 oz)   LMP 08/10/2019 (Exact Date)   Breastfeeding? No   BMI 25 86 kg/m²          Physical Exam   Constitutional: She appears well-developed and well-nourished  Cardiovascular: Normal rate and regular rhythm  Pulmonary/Chest: Effort normal and breath sounds normal  Right breast exhibits no inverted nipple, no mass, no nipple discharge, no skin change and no tenderness  Left breast exhibits no inverted nipple, no mass, no nipple discharge, no skin change and no tenderness  Abdominal: Soft  Bowel sounds are normal  She exhibits no distension  There is no tenderness  There is no rebound and no guarding  Genitourinary: Vagina normal and uterus normal  There is no lesion on the right labia  There is no lesion on the left labia  Cervix exhibits no discharge and no friability  Right adnexum displays no mass, no tenderness and no fullness  Left adnexum displays no mass, no tenderness and no fullness  No vaginal discharge found

## 2019-09-12 ENCOUNTER — OFFICE VISIT (OUTPATIENT)
Dept: SURGICAL ONCOLOGY | Facility: CLINIC | Age: 34
End: 2019-09-12
Payer: COMMERCIAL

## 2019-09-12 VITALS
SYSTOLIC BLOOD PRESSURE: 90 MMHG | DIASTOLIC BLOOD PRESSURE: 60 MMHG | WEIGHT: 160.5 LBS | RESPIRATION RATE: 16 BRPM | TEMPERATURE: 97.7 F | BODY MASS INDEX: 25.79 KG/M2 | HEIGHT: 66 IN | HEART RATE: 87 BPM

## 2019-09-12 DIAGNOSIS — N60.11 FIBROCYSTIC BREAST CHANGES OF BOTH BREASTS: Primary | ICD-10-CM

## 2019-09-12 DIAGNOSIS — N60.12 FIBROCYSTIC BREAST CHANGES OF BOTH BREASTS: Primary | ICD-10-CM

## 2019-09-12 DIAGNOSIS — R92.2 DENSE BREAST TISSUE: ICD-10-CM

## 2019-09-12 PROCEDURE — 99213 OFFICE O/P EST LOW 20 MIN: CPT | Performed by: SURGERY

## 2019-09-12 NOTE — PROGRESS NOTES
Surgical Oncology Follow Up       [address] [facility],[address]  [facility] [place]  [address] [facility] [address]    [place]  1985  [id number]  [address] [facility]  [facility] [place]  [address] [facility] [address]    Chief Complaint   Patient presents with    Follow-up       Assessment/Plan   Diagnoses and all orders for this visit:    Fibrocystic breast changes of both breasts    Dense breast tissue        Advance Care Planning/Advance Directives:  Did not discuss  with the patient  Oncology History:     No history exists  History of Present Illness: follow up  -Interval History:none    Review of Systems:  Review of Systems   Constitutional: Negative  Negative for appetite change and fever  Eyes: Negative  Respiratory: Negative for shortness of breath  Cardiovascular: Negative  Gastrointestinal: Negative  Endocrine: Negative  Genitourinary: Negative  Musculoskeletal: Negative  Negative for arthralgias and myalgias  Skin: Negative  Allergic/Immunologic: Negative  Neurological: Negative  Hematological: Negative  Negative for adenopathy  Does not bruise/bleed easily  Psychiatric/Behavioral: Negative          Patient Active Problem List   Diagnosis    Fibrocystic breast changes of both breasts    Dense breast tissue     Past Medical History:   Diagnosis Date    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    Pelvic MRI revealed septated uterus    PCOS (polycystic ovarian syndrome)     Upper back pain     Wears glasses      Past Surgical History:   Procedure Laterality Date    KNEE SURGERY      Skin Debridement    TOOTH EXTRACTION       Family History   Problem Relation Age of Onset    Prostate cancer Maternal Grandfather [de-identified]    Lung cancer Paternal Grandmother 72    Psychiatric Illness Family     Pancreatic cancer Mother 61    Asthma Mother     Hypertension Father      Social History Socioeconomic History    Marital status: Single     Spouse name: Not on file    Number of children: Not on file    Years of education: Not on file    Highest education level: Not on file   Occupational History    Not on file   Social Needs    Financial resource strain: Not on file    Food insecurity:     Worry: Not on file     Inability: Not on file    Transportation needs:     Medical: Not on file     Non-medical: Not on file   Tobacco Use    Smoking status: Never Smoker    Smokeless tobacco: Never Used   Substance and Sexual Activity    Alcohol use: No    Drug use: No    Sexual activity: Not Currently   Lifestyle    Physical activity:     Days per week: Not on file     Minutes per session: Not on file    Stress: Not on file   Relationships    Social connections:     Talks on phone: Not on file     Gets together: Not on file     Attends Voodoo service: Not on file     Active member of club or organization: Not on file     Attends meetings of clubs or organizations: Not on file     Relationship status: Not on file    Intimate partner violence:     Fear of current or ex partner: Not on file     Emotionally abused: Not on file     Physically abused: Not on file     Forced sexual activity: Not on file   Other Topics Concern    Not on file   Social History Narrative    Not on file       Current Outpatient Medications:     ibuprofen (MOTRIN) 800 mg tablet, Take 1 tablet (800 mg total) by mouth every 8 (eight) hours as needed for moderate pain, Disp: 60 tablet, Rfl: 1    Multiple Vitamin (MULTI-DAY VITAMINS) TABS, Take by mouth, Disp: , Rfl:     saccharomyces boulardii (FLORASTOR) 250 mg capsule, Take 250 mg by mouth daily, Disp: , Rfl:     tiZANidine (ZANAFLEX) 4 mg tablet, Take 1 tablet (4 mg total) by mouth every 8 (eight) hours as needed for muscle spasms (Patient not taking: Reported on 8/29/2019), Disp: 30 tablet, Rfl: 0  Allergies   Allergen Reactions    Sulfa Antibiotics Hives, Itching and Fever    Amoxicillin Fever, Hives, Itching and Rash    Mold Extract [Trichophyton] Allergic Rhinitis    Penicillins Fever, Hives, Itching and Rash       The following portions of the patient's history were reviewed and updated as appropriate: allergies, current medications, past family history, past medical history, past social history, past surgical history and problem list         Vitals:    19 0839   BP: 90/60   Pulse: 87   Resp: 16   Temp: 97 7 °F (36 5 °C)       Physical Exam   Constitutional: She is oriented to person, place, and time  She appears well-developed and well-nourished  HENT:   Head: Normocephalic and atraumatic  Pulmonary/Chest: Right breast exhibits no inverted nipple, no mass, no nipple discharge, no skin change and no tenderness  Left breast exhibits no inverted nipple, no mass, no nipple discharge, no skin change and no tenderness  Lymphadenopathy:        Right axillary: No pectoral and no lateral adenopathy present  Left axillary: No pectoral and no lateral adenopathy present  Right: No supraclavicular adenopathy present  Left: No supraclavicular adenopathy present  Neurological: She is alert and oriented to person, place, and time  Psychiatric: She has a normal mood and affect  Discussion/Summary:  59-year-old female with dense breast tissue and fibrocystic changes  Her mother is  from pancreatic cancer  Her mother did have genetic testing which was negative  There are no concerns on examination today  She recently saw her gynecologist   I will therefore see her again in six months and then plan to start annual exams again from there so that are exams are staggered  I advised her to return sooner should the need arise

## 2019-10-08 ENCOUNTER — OFFICE VISIT (OUTPATIENT)
Dept: URGENT CARE | Facility: CLINIC | Age: 34
End: 2019-10-08
Payer: COMMERCIAL

## 2019-10-08 VITALS
OXYGEN SATURATION: 100 % | HEART RATE: 118 BPM | BODY MASS INDEX: 25.75 KG/M2 | DIASTOLIC BLOOD PRESSURE: 73 MMHG | TEMPERATURE: 98.5 F | RESPIRATION RATE: 16 BRPM | HEIGHT: 66 IN | SYSTOLIC BLOOD PRESSURE: 122 MMHG | WEIGHT: 160.2 LBS

## 2019-10-08 DIAGNOSIS — J06.9 ACUTE URI: Primary | ICD-10-CM

## 2019-10-08 PROCEDURE — S9088 SERVICES PROVIDED IN URGENT: HCPCS | Performed by: NURSE PRACTITIONER

## 2019-10-08 PROCEDURE — 99213 OFFICE O/P EST LOW 20 MIN: CPT | Performed by: NURSE PRACTITIONER

## 2019-10-08 NOTE — PROGRESS NOTES
[facility]        NAME: Tootie Cueto is a 35 y o  female  : 1985    MRN: [id number]  DATE: 2019  TIME: 7:39 PM    Assessment and Plan   Acute URI [J06 9]  1  Acute URI           Patient Instructions   Flonase 1 spray each nostril daily  Saline nasal spray as directed to rinse sinus passages  Pseudoephedrine 1-2 tablets every 6 hours as needed for congestion  Increase your fluid intake  Tylenol and/or Motrin as needed for pain or fever  Mucinex or delsym as needed for cough  Follow up with your PCP for worsening or concerning symptoms    Follow up with PCP in 3-5 days  Proceed to  ER if symptoms worsen  Chief Complaint     Chief Complaint   Patient presents with    Cold Like Symptoms     Pt reports head congestion and chest congestion she stated that she starrted with symptoms Sat and  progressive  History of Present Illness         Patient is a 29-year-old female presenting with a 3 day history of sore throat, nasal congestion, and cough  Associated with fatigue and sweats  Max temperature 99 9°  The cough  Is moist and occasionally productive  She has been using ibuprofen, Mucinex, Sudafed, and airborne for symptomatic relief  She is a non smoker  Review of Systems   Review of Systems   Constitutional: Positive for diaphoresis and fatigue  Negative for activity change, chills and fever  HENT: Positive for congestion and sore throat  Negative for ear pain, rhinorrhea and sinus pain  Respiratory: Positive for cough  Negative for shortness of breath  Gastrointestinal: Negative for abdominal pain, diarrhea, nausea and vomiting  Skin: Negative for rash and wound  Neurological: Negative for headaches           Current Medications       Current Outpatient Medications:     ibuprofen (MOTRIN) 800 mg tablet, Take 1 tablet (800 mg total) by mouth every 8 (eight) hours as needed for moderate pain, Disp: 60 tablet, Rfl: 1    Multiple Vitamin (MULTI-DAY VITAMINS) TABS, Take by mouth, Disp: , Rfl:     saccharomyces boulardii (FLORASTOR) 250 mg capsule, Take 250 mg by mouth daily, Disp: , Rfl:     tiZANidine (ZANAFLEX) 4 mg tablet, Take 1 tablet (4 mg total) by mouth every 8 (eight) hours as needed for muscle spasms, Disp: 30 tablet, Rfl: 0    Current Allergies     Allergies as of 10/08/2019 - Reviewed 10/08/2019   Allergen Reaction Noted    Sulfa antibiotics Hives, Itching, and Fever 03/08/2014    Amoxicillin Fever, Hives, Itching, and Rash 08/27/2014    Mold extract [trichophyton] Allergic Rhinitis 08/27/2014    Penicillins Fever, Hives, Itching, and Rash 08/27/2014            The following portions of the patient's history were reviewed and updated as appropriate: allergies, current medications, past family history, past medical history, past social history, past surgical history and problem list      Past Medical History:   Diagnosis Date    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    Pelvic MRI revealed septated uterus    PCOS (polycystic ovarian syndrome)     Upper back pain     Wears glasses        Past Surgical History:   Procedure Laterality Date    KNEE SURGERY      Skin Debridement    TOOTH EXTRACTION         Family History   Problem Relation Age of Onset    Prostate cancer Maternal Grandfather [de-identified]    Lung cancer Paternal Grandmother 72    Psychiatric Illness Family     Pancreatic cancer Mother 61    Asthma Mother     Hypertension Father          Medications have been verified  Objective   /73   Pulse (!) 118   Temp 98 5 °F (36 9 °C)   Resp 16   Ht 5' 6" (1 676 m)   Wt 72 7 kg (160 lb 3 2 oz)   LMP 10/07/2019   SpO2 100%   BMI 25 86 kg/m²        Physical Exam     Physical Exam   Constitutional: She is oriented to person, place, and time  She appears well-developed and well-nourished  She is active  No distress  HENT:   Head: Normocephalic     Right Ear: Hearing, tympanic membrane, external ear and ear canal normal    Left Ear: Hearing, tympanic membrane, external ear and ear canal normal    Nose: Nose normal    Mouth/Throat: Uvula is midline, oropharynx is clear and moist and mucous membranes are normal    Cardiovascular: Regular rhythm, S1 normal, S2 normal and normal heart sounds  Tachycardia present  Pulmonary/Chest: Effort normal and breath sounds normal  No respiratory distress  She has no decreased breath sounds  She has no wheezes  She has no rhonchi  She has no rales  Neurological: She is alert and oriented to person, place, and time  She is not disoriented  Skin: Skin is warm and dry

## 2019-10-08 NOTE — PATIENT INSTRUCTIONS
Flonase 1 spray each nostril daily  Saline nasal spray as directed to rinse sinus passages  Pseudoephedrine 1-2 tablets every 6 hours as needed for congestion  Increase your fluid intake  Tylenol and/or Motrin as needed for pain or fever  Mucinex or delsym as needed for cough  Follow up with your PCP for worsening or concerning symptoms    Upper Respiratory Infection   WHAT YOU NEED TO KNOW:   An upper respiratory infection is also called the common cold  It is an infection that can affect your nose, throat, ears, and sinuses  For healthy people, the common cold is usually not serious and does not need special treatment  Cold symptoms are usually worst for the first 3 to 5 days  Most people get better in 7 to 14 days  You may continue to cough for 2 to 3 weeks  Colds are caused by viruses and do not get better with antibiotics  DISCHARGE INSTRUCTIONS:   Return to the emergency department if:   · You have chest pain or trouble breathing  Contact your healthcare provider if:   · You have a fever over 102ºF (39°C)  · Your sore throat gets worse or you see white or yellow spots in your throat  · Your symptoms get worse after 3 to 5 days or your cold is not better in 14 days  · You have a rash anywhere on your skin  · You have large, tender lumps in your neck  · You have thick, green or yellow drainage from your nose  · You cough up thick yellow, green, or bloody mucus  · You have vomiting for more than 24 hours and cannot keep fluids down  · You have a bad earache  · You have questions or concerns about your condition or care  Medicines: You may need any of the following:  · Decongestants  help reduce nasal congestion and help you breathe more easily  If you take decongestant pills, they may make you feel restless or cause problems with your sleep  Do not use decongestant sprays for more than a few days  · Cough suppressants  help reduce coughing   Ask your healthcare provider which type of cough medicine is best for you  · NSAIDs , such as ibuprofen, help decrease swelling, pain, and fever  NSAIDs can cause stomach bleeding or kidney problems in certain people  If you take blood thinner medicine, always ask your healthcare provider if NSAIDs are safe for you  Always read the medicine label and follow directions  · Acetaminophen  decreases pain and fever  It is available without a doctor's order  Ask how much to take and how often to take it  Follow directions  Read the labels of all other medicines you are using to see if they also contain acetaminophen, or ask your doctor or pharmacist  Acetaminophen can cause liver damage if not taken correctly  Do not use more than 4 grams (4,000 milligrams) total of acetaminophen in one day  · Take your medicine as directed  Contact your healthcare provider if you think your medicine is not helping or if you have side effects  Tell him or her if you are allergic to any medicine  Keep a list of the medicines, vitamins, and herbs you take  Include the amounts, and when and why you take them  Bring the list or the pill bottles to follow-up visits  Carry your medicine list with you in case of an emergency  Follow up with your healthcare provider as directed:  Write down your questions so you remember to ask them during your visits  Self-care:   · Rest as much as possible  Slowly start to do more each day  · Drink more liquids as directed  Liquids will help thin and loosen mucus so you can cough it up  Liquids will also help prevent dehydration  Liquids that help prevent dehydration include water, fruit juice, and broth  Do not drink liquids that contain caffeine  Caffeine can increase your risk for dehydration  Ask your healthcare provider how much liquid to drink each day  · Soothe a sore throat  Gargle with warm salt water  This helps your sore throat feel better   Make salt water by dissolving ¼ teaspoon salt in 1 cup warm water  You may also suck on hard candy or throat lozenges  You may use a sore throat spray  · Use a humidifier or vaporizer  Use a cool mist humidifier or a vaporizer to increase air moisture in your home  This may make it easier for you to breathe and help decrease your cough  · Use saline nasal drops as directed  These help relieve congestion  · Apply petroleum-based jelly around the outside of your nostrils  This can decrease irritation from blowing your nose  · Do not smoke  Nicotine and other chemicals in cigarettes and cigars can make your symptoms worse  They can also cause infections such as bronchitis or pneumonia  Ask your healthcare provider for information if you currently smoke and need help to quit  E-cigarettes or smokeless tobacco still contain nicotine  Talk to your healthcare provider before you use these products  Prevent spreading your cold to others:   · Try to stay away from other people during the first 2 to 3 days of your cold when it is more easily spread  · Do not share food or drinks  · Do not share hand towels with household members  · Wash your hands often, especially after you blow your nose  Turn away from other people and cover your mouth and nose with a tissue when you sneeze or cough  © 2017 [employer]  Information is for End User's use only and may not be sold, redistributed or otherwise used for commercial purposes  All illustrations and images included in CareNotes® are the copyrighted property of [employer]  or [employer]  The above information is an  only  It is not intended as medical advice for individual conditions or treatments  Talk to your doctor, nurse or pharmacist before following any medical regimen to see if it is safe and effective for you

## 2019-10-08 NOTE — LETTER
October 8, 2019     Patient: Cain Montiel   YOB: 1985   Date of Visit: 10/8/2019       To Whom it May Concern:    Cain Montiel was seen in my clinic on 10/8/2019  She may return to work on 10/10/2019  If you have any questions or concerns, please don't hesitate to call           Sincerely,          BORIS Vidales      CC: Cain Edwardks

## 2019-11-22 ENCOUNTER — TELEPHONE (OUTPATIENT)
Dept: INTERNAL MEDICINE CLINIC | Facility: CLINIC | Age: 34
End: 2019-11-22

## 2019-11-22 NOTE — TELEPHONE ENCOUNTER
Pt called in requesting a letter stating she is allowed to wear sneakers at work due to orthopaedic issues    Says she had knee surgery in 2009 as well as foot problems and had a job transfer to [address] [place] [address] and they are not allowing her to wear sneakers    Please advise, ty

## 2019-11-26 ENCOUNTER — OFFICE VISIT (OUTPATIENT)
Dept: INTERNAL MEDICINE CLINIC | Facility: CLINIC | Age: 34
End: 2019-11-26
Payer: COMMERCIAL

## 2019-11-26 VITALS
BODY MASS INDEX: 25.23 KG/M2 | SYSTOLIC BLOOD PRESSURE: 111 MMHG | HEIGHT: 66 IN | HEART RATE: 90 BPM | DIASTOLIC BLOOD PRESSURE: 60 MMHG | TEMPERATURE: 98.8 F | WEIGHT: 157 LBS | OXYGEN SATURATION: 96 %

## 2019-11-26 DIAGNOSIS — N60.11 FIBROCYSTIC BREAST CHANGES OF BOTH BREASTS: ICD-10-CM

## 2019-11-26 DIAGNOSIS — Z13.220 SCREENING, LIPID: ICD-10-CM

## 2019-11-26 DIAGNOSIS — G89.29 CHRONIC FOOT PAIN, RIGHT: Primary | ICD-10-CM

## 2019-11-26 DIAGNOSIS — K05.30 PERIODONTITIS: ICD-10-CM

## 2019-11-26 DIAGNOSIS — N60.12 FIBROCYSTIC BREAST CHANGES OF BOTH BREASTS: ICD-10-CM

## 2019-11-26 DIAGNOSIS — M79.671 CHRONIC FOOT PAIN, RIGHT: Primary | ICD-10-CM

## 2019-11-26 DIAGNOSIS — E28.2 PCOS (POLYCYSTIC OVARIAN SYNDROME): ICD-10-CM

## 2019-11-26 DIAGNOSIS — Z13.1 SCREENING FOR DIABETES MELLITUS: ICD-10-CM

## 2019-11-26 PROCEDURE — 99214 OFFICE O/P EST MOD 30 MIN: CPT | Performed by: INTERNAL MEDICINE

## 2019-11-26 PROCEDURE — 1036F TOBACCO NON-USER: CPT | Performed by: INTERNAL MEDICINE

## 2019-11-26 RX ORDER — CHLORHEXIDINE GLUCONATE 0.12 MG/ML
RINSE ORAL
Refills: 3 | COMMUNITY
Start: 2019-10-25 | End: 2022-03-15

## 2019-11-26 NOTE — PROGRESS NOTES
Assessment/Plan:    No problem-specific Assessment & Plan notes found for this encounter  Problem List Items Addressed This Visit     None      Visit Diagnoses     Need for vaccination    -  Primary            Subjective:      Patient ID: Cain Mnotiel is a 29 y o  female      HPI    The following portions of the patient's history were reviewed and updated as appropriate: allergies, current medications, past family history, past medical history, past social history and problem list     Review of Systems      Objective:      /60   Pulse 90   Temp 98 8 °F (37 1 °C)   Ht 5' 6" (1 676 m)   Wt 71 2 kg (157 lb)   SpO2 96%   BMI 25 34 kg/m²          Physical Exam

## 2019-11-26 NOTE — LETTER
2019    To whom it may concern:    Please allow Swethaviral Shafer (  1985) to wear alternate footwear like sneakers/rubber shoes as needed at work for chronic foot issues  Please feel free to call with any questions       Sincerely,    Salvador Gamble MD

## 2019-11-26 NOTE — PROGRESS NOTES
Assessment/Plan:  Letter for work provided today  BMI Counseling: Body mass index is 25 34 kg/m²  The BMI is above normal  Exercise recommendations include exercising 3-5 times per week  Problem List Items Addressed This Visit        Digestive    Periodontitis       Endocrine    PCOS (polycystic ovarian syndrome)    Relevant Orders    Comprehensive metabolic panel    CBC and differential    TSH, 3rd generation with Free T4 reflex       Other    Fibrocystic breast changes of both breasts    Chronic foot pain, right - Primary    Relevant Orders    XR foot 3+ vw right      Other Visit Diagnoses     Screening, lipid        Relevant Orders    Lipid panel    Screening for diabetes mellitus        Relevant Orders    Hemoglobin A1C            Subjective:      Patient ID: Marvina Meigs is a 29 y o  female  HPI  Here for a follow up  Main issue is need for a work note to allow her to wear sneakers at work  She has chronic pain in the base of the right great toe which is alleviated by certain sneakers  Old injuries to the foot from soccer, rock climbing  No previous surgery to the foot  She used to see podiatry ([facility])    The following portions of the patient's history were reviewed and updated as appropriate: allergies, past family history, past medical history, past social history, past surgical history and problem list     Review of Systems   Constitutional: Negative for chills, fatigue, fever and unexpected weight change  HENT: Negative for congestion, postnasal drip, rhinorrhea, sinus pressure and sore throat  Respiratory: Negative for cough, shortness of breath and wheezing  Cardiovascular: Negative for chest pain, palpitations and leg swelling  Gastrointestinal: Negative for abdominal pain, constipation, diarrhea, nausea and vomiting  Genitourinary: Negative for difficulty urinating, dysuria and menstrual problem     Musculoskeletal: Positive for arthralgias (chronic right foot pain, see hpi) and neck pain  Negative for myalgias  Neurological: Negative for dizziness and headaches  Psychiatric/Behavioral: Negative for dysphoric mood and sleep disturbance  The patient is not nervous/anxious  Objective:      /60   Pulse 90   Temp 98 8 °F (37 1 °C)   Ht 5' 6" (1 676 m)   Wt 71 2 kg (157 lb)   SpO2 96%   BMI 25 34 kg/m²          Physical Exam   Constitutional: She is oriented to person, place, and time  She appears well-developed and well-nourished  HENT:   Head: Normocephalic and atraumatic  Right Ear: External ear normal    Left Ear: External ear normal    Mouth/Throat: Oropharynx is clear and moist    Eyes: Conjunctivae are normal    Neck: Neck supple  Cardiovascular: Normal rate, regular rhythm and normal heart sounds  No murmur heard  Pulmonary/Chest: Effort normal and breath sounds normal  No respiratory distress  She has no wheezes  She has no rales  Abdominal: Soft  She exhibits no distension and no mass  There is no tenderness  There is no rebound and no guarding  Musculoskeletal:   No obvious deformity of the right great toe, no tenderness to touch   Neurological: She is alert and oriented to person, place, and time  Skin: Skin is warm and dry  Psychiatric: She has a normal mood and affect   Her behavior is normal  Judgment and thought content normal

## 2019-12-11 ENCOUNTER — HOSPITAL ENCOUNTER (OUTPATIENT)
Dept: RADIOLOGY | Facility: HOSPITAL | Age: 34
Discharge: HOME | End: 2019-12-11
Payer: COMMERCIAL

## 2019-12-11 DIAGNOSIS — G89.29 CHRONIC FOOT PAIN, RIGHT: ICD-10-CM

## 2019-12-11 DIAGNOSIS — M79.671 CHRONIC FOOT PAIN, RIGHT: ICD-10-CM

## 2019-12-11 PROCEDURE — 73630 X-RAY EXAM OF FOOT: CPT

## 2020-05-07 ENCOUNTER — OFFICE VISIT (OUTPATIENT)
Dept: SURGICAL ONCOLOGY | Facility: CLINIC | Age: 35
End: 2020-05-07
Payer: COMMERCIAL

## 2020-05-07 VITALS
HEIGHT: 66 IN | SYSTOLIC BLOOD PRESSURE: 118 MMHG | DIASTOLIC BLOOD PRESSURE: 74 MMHG | TEMPERATURE: 97.6 F | BODY MASS INDEX: 25.15 KG/M2 | HEART RATE: 64 BPM | WEIGHT: 156.5 LBS

## 2020-05-07 DIAGNOSIS — R92.2 DENSE BREAST TISSUE: Primary | ICD-10-CM

## 2020-05-07 DIAGNOSIS — N60.12 FIBROCYSTIC BREAST CHANGES OF BOTH BREASTS: ICD-10-CM

## 2020-05-07 DIAGNOSIS — N60.11 FIBROCYSTIC BREAST CHANGES OF BOTH BREASTS: ICD-10-CM

## 2020-05-07 PROCEDURE — 3008F BODY MASS INDEX DOCD: CPT | Performed by: SURGERY

## 2020-05-07 PROCEDURE — 1036F TOBACCO NON-USER: CPT | Performed by: SURGERY

## 2020-05-07 PROCEDURE — 99213 OFFICE O/P EST LOW 20 MIN: CPT | Performed by: SURGERY

## 2020-05-26 ENCOUNTER — APPOINTMENT (OUTPATIENT)
Dept: LAB | Facility: CLINIC | Age: 35
End: 2020-05-26
Payer: COMMERCIAL

## 2020-05-26 DIAGNOSIS — Z13.1 SCREENING FOR DIABETES MELLITUS: ICD-10-CM

## 2020-05-26 DIAGNOSIS — Z13.220 SCREENING, LIPID: ICD-10-CM

## 2020-05-26 DIAGNOSIS — E28.2 PCOS (POLYCYSTIC OVARIAN SYNDROME): ICD-10-CM

## 2020-05-26 LAB
ALBUMIN SERPL BCP-MCNC: 4.3 G/DL (ref 3.5–5)
ALP SERPL-CCNC: 50 U/L (ref 46–116)
ALT SERPL W P-5'-P-CCNC: 31 U/L (ref 12–78)
ANION GAP SERPL CALCULATED.3IONS-SCNC: 8 MMOL/L (ref 4–13)
AST SERPL W P-5'-P-CCNC: 16 U/L (ref 5–45)
BASOPHILS # BLD AUTO: 0.03 THOUSANDS/ΜL (ref 0–0.1)
BASOPHILS NFR BLD AUTO: 1 % (ref 0–1)
BILIRUB SERPL-MCNC: 1.13 MG/DL (ref 0.2–1)
BUN SERPL-MCNC: 12 MG/DL (ref 5–25)
CALCIUM SERPL-MCNC: 9 MG/DL (ref 8.3–10.1)
CHLORIDE SERPL-SCNC: 106 MMOL/L (ref 100–108)
CHOLEST SERPL-MCNC: 129 MG/DL (ref 50–200)
CO2 SERPL-SCNC: 28 MMOL/L (ref 21–32)
CREAT SERPL-MCNC: 0.67 MG/DL (ref 0.6–1.3)
EOSINOPHIL # BLD AUTO: 0.05 THOUSAND/ΜL (ref 0–0.61)
EOSINOPHIL NFR BLD AUTO: 1 % (ref 0–6)
ERYTHROCYTE [DISTWIDTH] IN BLOOD BY AUTOMATED COUNT: 13.1 % (ref 11.6–15.1)
EST. AVERAGE GLUCOSE BLD GHB EST-MCNC: 94 MG/DL
GFR SERPL CREATININE-BSD FRML MDRD: 115 ML/MIN/1.73SQ M
GLUCOSE P FAST SERPL-MCNC: 90 MG/DL (ref 65–99)
HBA1C MFR BLD: 4.9 %
HCT VFR BLD AUTO: 39.6 % (ref 34.8–46.1)
HDLC SERPL-MCNC: 63 MG/DL
HGB BLD-MCNC: 12.9 G/DL (ref 11.5–15.4)
IMM GRANULOCYTES # BLD AUTO: 0.03 THOUSAND/UL (ref 0–0.2)
IMM GRANULOCYTES NFR BLD AUTO: 1 % (ref 0–2)
LDLC SERPL CALC-MCNC: 60 MG/DL (ref 0–100)
LYMPHOCYTES # BLD AUTO: 1.21 THOUSANDS/ΜL (ref 0.6–4.47)
LYMPHOCYTES NFR BLD AUTO: 19 % (ref 14–44)
MCH RBC QN AUTO: 31.2 PG (ref 26.8–34.3)
MCHC RBC AUTO-ENTMCNC: 32.6 G/DL (ref 31.4–37.4)
MCV RBC AUTO: 96 FL (ref 82–98)
MONOCYTES # BLD AUTO: 0.32 THOUSAND/ΜL (ref 0.17–1.22)
MONOCYTES NFR BLD AUTO: 5 % (ref 4–12)
NEUTROPHILS # BLD AUTO: 4.86 THOUSANDS/ΜL (ref 1.85–7.62)
NEUTS SEG NFR BLD AUTO: 73 % (ref 43–75)
NONHDLC SERPL-MCNC: 66 MG/DL
NRBC BLD AUTO-RTO: 0 /100 WBCS
PLATELET # BLD AUTO: 180 THOUSANDS/UL (ref 149–390)
PMV BLD AUTO: 11.6 FL (ref 8.9–12.7)
POTASSIUM SERPL-SCNC: 4.2 MMOL/L (ref 3.5–5.3)
PROT SERPL-MCNC: 7.1 G/DL (ref 6.4–8.2)
RBC # BLD AUTO: 4.13 MILLION/UL (ref 3.81–5.12)
SODIUM SERPL-SCNC: 142 MMOL/L (ref 136–145)
TRIGL SERPL-MCNC: 31 MG/DL
TSH SERPL DL<=0.05 MIU/L-ACNC: 0.61 UIU/ML (ref 0.36–3.74)
WBC # BLD AUTO: 6.5 THOUSAND/UL (ref 4.31–10.16)

## 2020-05-26 PROCEDURE — 84443 ASSAY THYROID STIM HORMONE: CPT

## 2020-05-26 PROCEDURE — 36415 COLL VENOUS BLD VENIPUNCTURE: CPT

## 2020-05-26 PROCEDURE — 85025 COMPLETE CBC W/AUTO DIFF WBC: CPT

## 2020-05-26 PROCEDURE — 80061 LIPID PANEL: CPT

## 2020-05-26 PROCEDURE — 83036 HEMOGLOBIN GLYCOSYLATED A1C: CPT

## 2020-05-26 PROCEDURE — 80053 COMPREHEN METABOLIC PANEL: CPT

## 2020-05-28 ENCOUNTER — TELEPHONE (OUTPATIENT)
Dept: INTERNAL MEDICINE CLINIC | Facility: CLINIC | Age: 35
End: 2020-05-28

## 2020-06-01 ENCOUNTER — OFFICE VISIT (OUTPATIENT)
Dept: INTERNAL MEDICINE CLINIC | Facility: CLINIC | Age: 35
End: 2020-06-01
Payer: COMMERCIAL

## 2020-06-01 VITALS
HEART RATE: 81 BPM | WEIGHT: 151.2 LBS | HEIGHT: 66 IN | DIASTOLIC BLOOD PRESSURE: 62 MMHG | SYSTOLIC BLOOD PRESSURE: 118 MMHG | TEMPERATURE: 97.5 F | OXYGEN SATURATION: 98 % | BODY MASS INDEX: 24.3 KG/M2

## 2020-06-01 DIAGNOSIS — Z23 NEED FOR VACCINATION: ICD-10-CM

## 2020-06-01 DIAGNOSIS — M54.50 CHRONIC BILATERAL LOW BACK PAIN WITHOUT SCIATICA: ICD-10-CM

## 2020-06-01 DIAGNOSIS — Z00.00 WELLNESS EXAMINATION: Primary | ICD-10-CM

## 2020-06-01 DIAGNOSIS — M54.2 CERVICALGIA: ICD-10-CM

## 2020-06-01 DIAGNOSIS — Z13.220 SCREENING, LIPID: ICD-10-CM

## 2020-06-01 DIAGNOSIS — Z13.1 SCREENING FOR DIABETES MELLITUS: ICD-10-CM

## 2020-06-01 DIAGNOSIS — G89.29 CHRONIC BILATERAL LOW BACK PAIN WITHOUT SCIATICA: ICD-10-CM

## 2020-06-01 DIAGNOSIS — R17 ELEVATED BILIRUBIN: ICD-10-CM

## 2020-06-01 PROCEDURE — 99395 PREV VISIT EST AGE 18-39: CPT | Performed by: INTERNAL MEDICINE

## 2020-06-01 RX ORDER — TIZANIDINE 4 MG/1
4 TABLET ORAL EVERY 8 HOURS PRN
Qty: 30 TABLET | Refills: 0 | Status: SHIPPED | OUTPATIENT
Start: 2020-06-01 | End: 2020-06-01 | Stop reason: SDUPTHER

## 2020-06-01 RX ORDER — TIZANIDINE 4 MG/1
4 TABLET ORAL EVERY 8 HOURS PRN
Qty: 30 TABLET | Refills: 0 | Status: SHIPPED | OUTPATIENT
Start: 2020-06-01

## 2020-09-03 ENCOUNTER — ANNUAL EXAM (OUTPATIENT)
Dept: OBGYN CLINIC | Facility: CLINIC | Age: 35
End: 2020-09-03
Payer: COMMERCIAL

## 2020-09-03 VITALS
WEIGHT: 152 LBS | TEMPERATURE: 98 F | SYSTOLIC BLOOD PRESSURE: 110 MMHG | HEIGHT: 66 IN | DIASTOLIC BLOOD PRESSURE: 64 MMHG | BODY MASS INDEX: 24.43 KG/M2

## 2020-09-03 DIAGNOSIS — Q51.28 SEPTATE UTERUS: ICD-10-CM

## 2020-09-03 DIAGNOSIS — Z01.419 ENCOUNTER FOR ANNUAL ROUTINE GYNECOLOGICAL EXAMINATION: Primary | ICD-10-CM

## 2020-09-03 DIAGNOSIS — N92.0 MENORRHAGIA WITH REGULAR CYCLE: ICD-10-CM

## 2020-09-03 DIAGNOSIS — E28.2 PCO (POLYCYSTIC OVARIES): ICD-10-CM

## 2020-09-03 PROCEDURE — 99395 PREV VISIT EST AGE 18-39: CPT | Performed by: OBSTETRICS & GYNECOLOGY

## 2020-09-03 RX ORDER — TRETINOIN 0.05 G/100G
1 GEL TOPICAL
COMMUNITY
Start: 2020-07-09

## 2020-09-03 RX ORDER — CLINDAMYCIN PHOSPHATE 10 UG/ML
LOTION TOPICAL
COMMUNITY
Start: 2020-07-06

## 2020-09-03 RX ORDER — TRANEXAMIC ACID 650 1/1
650 TABLET ORAL 3 TIMES DAILY
Qty: 30 TABLET | Refills: 1 | Status: SHIPPED | OUTPATIENT
Start: 2020-09-03 | End: 2020-09-08

## 2020-09-03 NOTE — PROGRESS NOTES
Assessment/Plan:    Pap smear deferred due to low risk status  Encouraged self-breast examination as well as calcium supplementation  Recommend checking labs due to heavy menses including CBC with diff, ferritin and iron  Will obtain pelvic ultrasound  Implications of septated uterus reviewed  At this point she would like to proceed with consultation with [facility]  She may be interested in pregnancy in 1-2 years  Implications reviewed regarding septated uterus  Discussed treatment options for menorrhagia  She would like to try lysteda with next menstrual cycle  I will notify her the above results via telephone  She will resume annual gyn exam     No problem-specific Assessment & Plan notes found for this encounter  Diagnoses and all orders for this visit:    Encounter for annual routine gynecological examination    Menorrhagia with regular cycle  -     CBC and differential  -     Ferritin  -     Iron  -     US pelvis complete w transvaginal; Future  -     Tranexamic Acid 650 MG TABS; Take 1 tablet (650 mg total) by mouth 3 (three) times a day for 5 days    PCO (polycystic ovaries)  -     Follicle stimulating hormone  -     Luteinizing hormone; Future    Septate uterus  -     Ambulatory referral to Infertility; Future    Other orders  -     clindamycin (CLEOCIN T) 1 % lotion  -     tretinoin (ALTRALIN) 0 05 %; Apply 1 application topically daily at bedtime To affected area          Subjective:      Patient ID: Rachel Coleman is a 29 y o  female  HPI     This is a very pleasant 40-year-old female G0 who presents for annual gyn exam   She states that her menstrual cycles have changed over the last 8 months, every 23 days lasting 5-6 days with no breakthrough bleeding  She states her menstrual cycles are significantly heavy year however her long history of menstrual cramping has markedly improved  She denies any changes in bowel or bladder function  She does have a long history of polycystic ovaries    She has had problems with cystic facial acne  She does follow up with Dermatology using topical agents  Patient has not been sexually active over the last 10 years  She is now dating, long distance for the last 8 months  She feels that this relationship will become more serious and has discussed forms of birth control  She was diagnosed with possible sub septated uterus approximately 3 years ago  She has been eating healthy and exercising regularly  Overall she feels significantly better than she has in a long time  Her Pap smears have been normal   Last Pap smear 2018 within normal limits  She continues to follow-up with her breast surgeon, history of fibrocystic breast   The following portions of the patient's history were reviewed and updated as appropriate: allergies, current medications, past family history, past medical history, past social history, past surgical history and problem list     Review of Systems   Constitutional: Negative for fatigue, fever and unexpected weight change  Respiratory: Negative for cough, chest tightness, shortness of breath and wheezing  Cardiovascular: Negative  Negative for chest pain and palpitations  Gastrointestinal: Negative  Negative for abdominal distention, abdominal pain, blood in stool, constipation, diarrhea, nausea and vomiting  Genitourinary: Negative  Negative for difficulty urinating, dyspareunia, dysuria, flank pain, frequency, genital sores, hematuria, pelvic pain, urgency, vaginal bleeding, vaginal discharge and vaginal pain  Skin: Negative for rash  Objective:      /64   Temp 98 °F (36 7 °C)   Ht 5' 6" (1 676 m)   Wt 68 9 kg (152 lb)   LMP 08/23/2020   BMI 24 53 kg/m²          Physical Exam  Constitutional:       Appearance: She is well-developed  Cardiovascular:      Rate and Rhythm: Normal rate and regular rhythm  Pulmonary:      Effort: Pulmonary effort is normal       Breath sounds: Normal breath sounds     Chest: Breasts:         Right: No inverted nipple, mass, nipple discharge, skin change or tenderness  Left: No inverted nipple, mass, nipple discharge, skin change or tenderness  Abdominal:      General: Bowel sounds are normal  There is no distension  Palpations: Abdomen is soft  Tenderness: There is no abdominal tenderness  There is no guarding or rebound  Genitourinary:     Labia:         Right: No lesion  Left: No lesion  Vagina: Normal  No vaginal discharge  Cervix: No discharge or friability  Uterus: Normal  Not tender  Adnexa:         Right: No mass, tenderness or fullness  Left: No mass, tenderness or fullness  Comments: External genitalia is within normal limits  The vagina is well estrogenized  Cervix is small nulliparous  There is no evidence of pelvic floor prolapse

## 2020-09-04 ENCOUNTER — APPOINTMENT (OUTPATIENT)
Dept: LAB | Facility: HOSPITAL | Age: 35
End: 2020-09-04
Attending: OBSTETRICS & GYNECOLOGY
Payer: COMMERCIAL

## 2020-09-04 DIAGNOSIS — E28.2 PCO (POLYCYSTIC OVARIES): ICD-10-CM

## 2020-09-04 LAB
BASOPHILS # BLD AUTO: 0.03 THOUSANDS/ΜL (ref 0–0.1)
BASOPHILS NFR BLD AUTO: 0 % (ref 0–1)
EOSINOPHIL # BLD AUTO: 0.09 THOUSAND/ΜL (ref 0–0.61)
EOSINOPHIL NFR BLD AUTO: 1 % (ref 0–6)
ERYTHROCYTE [DISTWIDTH] IN BLOOD BY AUTOMATED COUNT: 12.9 % (ref 11.6–15.1)
FERRITIN SERPL-MCNC: 19 NG/ML (ref 8–388)
FSH SERPL-ACNC: 6 MIU/ML
HCT VFR BLD AUTO: 35.4 % (ref 34.8–46.1)
HGB BLD-MCNC: 11.7 G/DL (ref 11.5–15.4)
IMM GRANULOCYTES # BLD AUTO: 0.03 THOUSAND/UL (ref 0–0.2)
IMM GRANULOCYTES NFR BLD AUTO: 0 % (ref 0–2)
IRON SERPL-MCNC: 46 UG/DL (ref 50–170)
LH SERPL-ACNC: 8.8 MIU/ML
LYMPHOCYTES # BLD AUTO: 1.65 THOUSANDS/ΜL (ref 0.6–4.47)
LYMPHOCYTES NFR BLD AUTO: 22 % (ref 14–44)
MCH RBC QN AUTO: 31.6 PG (ref 26.8–34.3)
MCHC RBC AUTO-ENTMCNC: 33.1 G/DL (ref 31.4–37.4)
MCV RBC AUTO: 96 FL (ref 82–98)
MONOCYTES # BLD AUTO: 0.44 THOUSAND/ΜL (ref 0.17–1.22)
MONOCYTES NFR BLD AUTO: 6 % (ref 4–12)
NEUTROPHILS # BLD AUTO: 5.13 THOUSANDS/ΜL (ref 1.85–7.62)
NEUTS SEG NFR BLD AUTO: 71 % (ref 43–75)
NRBC BLD AUTO-RTO: 0 /100 WBCS
PLATELET # BLD AUTO: 180 THOUSANDS/UL (ref 149–390)
PMV BLD AUTO: 11.6 FL (ref 8.9–12.7)
RBC # BLD AUTO: 3.7 MILLION/UL (ref 3.81–5.12)
WBC # BLD AUTO: 7.37 THOUSAND/UL (ref 4.31–10.16)

## 2020-09-04 PROCEDURE — 85025 COMPLETE CBC W/AUTO DIFF WBC: CPT | Performed by: OBSTETRICS & GYNECOLOGY

## 2020-09-04 PROCEDURE — 82728 ASSAY OF FERRITIN: CPT | Performed by: OBSTETRICS & GYNECOLOGY

## 2020-09-04 PROCEDURE — 83002 ASSAY OF GONADOTROPIN (LH): CPT

## 2020-09-04 PROCEDURE — 36415 COLL VENOUS BLD VENIPUNCTURE: CPT | Performed by: OBSTETRICS & GYNECOLOGY

## 2020-09-04 PROCEDURE — 83540 ASSAY OF IRON: CPT | Performed by: OBSTETRICS & GYNECOLOGY

## 2020-09-04 PROCEDURE — 83001 ASSAY OF GONADOTROPIN (FSH): CPT | Performed by: OBSTETRICS & GYNECOLOGY

## 2020-09-28 ENCOUNTER — HOSPITAL ENCOUNTER (OUTPATIENT)
Dept: ULTRASOUND IMAGING | Facility: HOSPITAL | Age: 35
Discharge: HOME | End: 2020-09-28
Payer: COMMERCIAL

## 2020-09-28 DIAGNOSIS — N92.0 MENORRHAGIA WITH REGULAR CYCLE: ICD-10-CM

## 2020-09-28 PROCEDURE — 76830 TRANSVAGINAL US NON-OB: CPT

## 2020-09-28 PROCEDURE — 76856 US EXAM PELVIC COMPLETE: CPT

## 2020-10-06 ENCOUNTER — TELEPHONE (OUTPATIENT)
Dept: OBGYN CLINIC | Facility: CLINIC | Age: 35
End: 2020-10-06

## 2020-10-19 ENCOUNTER — TRANSCRIBE ORDERS (OUTPATIENT)
Dept: ADMINISTRATIVE | Facility: HOSPITAL | Age: 35
End: 2020-10-19

## 2020-10-19 DIAGNOSIS — Z98.891 STATUS POST HYSTEROSCOPIC RESECTION OF UTERINE SEPTUM: Primary | ICD-10-CM

## 2020-10-21 ENCOUNTER — HOSPITAL ENCOUNTER (OUTPATIENT)
Dept: MRI IMAGING | Facility: HOSPITAL | Age: 35
Discharge: HOME | End: 2020-10-21
Attending: OBSTETRICS & GYNECOLOGY
Payer: COMMERCIAL

## 2020-10-21 DIAGNOSIS — Z98.891 STATUS POST HYSTEROSCOPIC RESECTION OF UTERINE SEPTUM: ICD-10-CM

## 2020-10-21 PROCEDURE — A9585 GADOBUTROL INJECTION: HCPCS | Performed by: OBSTETRICS & GYNECOLOGY

## 2020-10-21 PROCEDURE — G1004 CDSM NDSC: HCPCS

## 2020-10-21 PROCEDURE — 72197 MRI PELVIS W/O & W/DYE: CPT

## 2020-10-21 RX ADMIN — GADOBUTROL 7 ML: 604.72 INJECTION INTRAVENOUS at 16:02

## 2021-03-22 ENCOUNTER — IMMUNIZATIONS (OUTPATIENT)
Dept: FAMILY MEDICINE CLINIC | Facility: HOSPITAL | Age: 36
End: 2021-03-22

## 2021-03-22 DIAGNOSIS — Z23 ENCOUNTER FOR IMMUNIZATION: Primary | ICD-10-CM

## 2021-03-22 PROCEDURE — 0001A SARS-COV-2 / COVID-19 MRNA VACCINE (PFIZER-BIONTECH) 30 MCG: CPT | Performed by: FAMILY MEDICINE

## 2021-03-22 PROCEDURE — 91300 SARS-COV-2 / COVID-19 MRNA VACCINE (PFIZER-BIONTECH) 30 MCG: CPT | Performed by: FAMILY MEDICINE

## 2021-04-08 ENCOUNTER — OFFICE VISIT (OUTPATIENT)
Dept: SURGICAL ONCOLOGY | Facility: CLINIC | Age: 36
End: 2021-04-08
Payer: COMMERCIAL

## 2021-04-08 VITALS
TEMPERATURE: 98 F | BODY MASS INDEX: 25.39 KG/M2 | HEART RATE: 80 BPM | SYSTOLIC BLOOD PRESSURE: 104 MMHG | HEIGHT: 66 IN | WEIGHT: 158 LBS | DIASTOLIC BLOOD PRESSURE: 78 MMHG

## 2021-04-08 DIAGNOSIS — N60.12 FIBROCYSTIC BREAST CHANGES OF BOTH BREASTS: ICD-10-CM

## 2021-04-08 DIAGNOSIS — N60.11 FIBROCYSTIC BREAST CHANGES OF BOTH BREASTS: ICD-10-CM

## 2021-04-08 DIAGNOSIS — R92.2 DENSE BREAST TISSUE: Primary | ICD-10-CM

## 2021-04-08 PROCEDURE — 99213 OFFICE O/P EST LOW 20 MIN: CPT | Performed by: SURGERY

## 2021-04-15 ENCOUNTER — IMMUNIZATIONS (OUTPATIENT)
Dept: FAMILY MEDICINE CLINIC | Facility: HOSPITAL | Age: 36
End: 2021-04-15

## 2021-04-15 DIAGNOSIS — Z23 ENCOUNTER FOR IMMUNIZATION: Primary | ICD-10-CM

## 2021-04-15 PROCEDURE — 91300 SARS-COV-2 / COVID-19 MRNA VACCINE (PFIZER-BIONTECH) 30 MCG: CPT

## 2021-04-15 PROCEDURE — 0002A SARS-COV-2 / COVID-19 MRNA VACCINE (PFIZER-BIONTECH) 30 MCG: CPT

## 2021-06-03 ENCOUNTER — TRANSCRIBE ORDERS (OUTPATIENT)
Dept: LAB | Facility: CLINIC | Age: 36
End: 2021-06-03

## 2021-06-03 ENCOUNTER — APPOINTMENT (OUTPATIENT)
Dept: LAB | Facility: CLINIC | Age: 36
End: 2021-06-03
Payer: COMMERCIAL

## 2021-06-03 DIAGNOSIS — Z00.8 ENCOUNTER FOR OTHER GENERAL EXAMINATION: ICD-10-CM

## 2021-06-03 DIAGNOSIS — Z00.8 HEALTH EXAMINATION IN POPULATION SURVEY: Primary | ICD-10-CM

## 2021-06-03 DIAGNOSIS — Z00.8 HEALTH EXAMINATION IN POPULATION SURVEY: ICD-10-CM

## 2021-06-03 LAB
CHOLEST SERPL-MCNC: 157 MG/DL (ref 50–200)
EST. AVERAGE GLUCOSE BLD GHB EST-MCNC: 105 MG/DL
HBA1C MFR BLD: 5.3 %
HDLC SERPL-MCNC: 57 MG/DL
LDLC SERPL CALC-MCNC: 82 MG/DL (ref 0–100)
NONHDLC SERPL-MCNC: 100 MG/DL
TRIGL SERPL-MCNC: 92 MG/DL

## 2021-06-03 PROCEDURE — 36415 COLL VENOUS BLD VENIPUNCTURE: CPT

## 2021-06-03 PROCEDURE — 80061 LIPID PANEL: CPT

## 2021-06-03 PROCEDURE — 83036 HEMOGLOBIN GLYCOSYLATED A1C: CPT

## 2021-06-04 ENCOUNTER — APPOINTMENT (OUTPATIENT)
Dept: LAB | Facility: CLINIC | Age: 36
End: 2021-06-04
Payer: COMMERCIAL

## 2021-06-04 DIAGNOSIS — R17 ELEVATED BILIRUBIN: ICD-10-CM

## 2021-06-04 LAB
ALBUMIN SERPL BCP-MCNC: 4.1 G/DL (ref 3.5–5)
ALP SERPL-CCNC: 51 U/L (ref 46–116)
ALT SERPL W P-5'-P-CCNC: 51 U/L (ref 12–78)
ANION GAP SERPL CALCULATED.3IONS-SCNC: 5 MMOL/L (ref 4–13)
AST SERPL W P-5'-P-CCNC: 24 U/L (ref 5–45)
BILIRUB SERPL-MCNC: 0.76 MG/DL (ref 0.2–1)
BUN SERPL-MCNC: 15 MG/DL (ref 5–25)
CALCIUM SERPL-MCNC: 9.5 MG/DL (ref 8.3–10.1)
CHLORIDE SERPL-SCNC: 105 MMOL/L (ref 100–108)
CO2 SERPL-SCNC: 29 MMOL/L (ref 21–32)
CREAT SERPL-MCNC: 0.79 MG/DL (ref 0.6–1.3)
ERYTHROCYTE [DISTWIDTH] IN BLOOD BY AUTOMATED COUNT: 13 % (ref 11.6–15.1)
GFR SERPL CREATININE-BSD FRML MDRD: 97 ML/MIN/1.73SQ M
GLUCOSE P FAST SERPL-MCNC: 81 MG/DL (ref 65–99)
HCT VFR BLD AUTO: 40.1 % (ref 34.8–46.1)
HGB BLD-MCNC: 12.9 G/DL (ref 11.5–15.4)
MCH RBC QN AUTO: 30.3 PG (ref 26.8–34.3)
MCHC RBC AUTO-ENTMCNC: 32.2 G/DL (ref 31.4–37.4)
MCV RBC AUTO: 94 FL (ref 82–98)
PLATELET # BLD AUTO: 185 THOUSANDS/UL (ref 149–390)
PMV BLD AUTO: 11.9 FL (ref 8.9–12.7)
POTASSIUM SERPL-SCNC: 4.1 MMOL/L (ref 3.5–5.3)
PROT SERPL-MCNC: 6.7 G/DL (ref 6.4–8.2)
RBC # BLD AUTO: 4.26 MILLION/UL (ref 3.81–5.12)
SODIUM SERPL-SCNC: 139 MMOL/L (ref 136–145)
TSH SERPL DL<=0.05 MIU/L-ACNC: 1.68 UIU/ML (ref 0.36–3.74)
WBC # BLD AUTO: 6.59 THOUSAND/UL (ref 4.31–10.16)

## 2021-06-04 PROCEDURE — 84443 ASSAY THYROID STIM HORMONE: CPT

## 2021-06-04 PROCEDURE — 80053 COMPREHEN METABOLIC PANEL: CPT

## 2021-06-04 PROCEDURE — 36415 COLL VENOUS BLD VENIPUNCTURE: CPT

## 2021-06-04 PROCEDURE — 85027 COMPLETE CBC AUTOMATED: CPT

## 2021-06-08 ENCOUNTER — OFFICE VISIT (OUTPATIENT)
Dept: INTERNAL MEDICINE CLINIC | Facility: CLINIC | Age: 36
End: 2021-06-08
Payer: COMMERCIAL

## 2021-06-08 VITALS
OXYGEN SATURATION: 100 % | HEART RATE: 55 BPM | SYSTOLIC BLOOD PRESSURE: 100 MMHG | BODY MASS INDEX: 24.11 KG/M2 | DIASTOLIC BLOOD PRESSURE: 60 MMHG | WEIGHT: 150 LBS | HEIGHT: 66 IN | TEMPERATURE: 97.5 F

## 2021-06-08 DIAGNOSIS — Z00.00 LABORATORY EXAM ORDERED AS PART OF ROUTINE GENERAL MEDICAL EXAMINATION: ICD-10-CM

## 2021-06-08 DIAGNOSIS — Z00.00 ANNUAL PHYSICAL EXAM: Primary | ICD-10-CM

## 2021-06-08 DIAGNOSIS — E28.2 PCOS (POLYCYSTIC OVARIAN SYNDROME): ICD-10-CM

## 2021-06-08 PROCEDURE — 99395 PREV VISIT EST AGE 18-39: CPT | Performed by: INTERNAL MEDICINE

## 2021-06-08 NOTE — PROGRESS NOTES
ADULT ANNUAL PHYSICAL  [facility]    NAME: Judie Nissen  AGE: 28 y o  SEX: female  : 1985     DATE: 2021     Assessment and Plan:     Problem List Items Addressed This Visit        Endocrine    PCOS (polycystic ovarian syndrome)    Relevant Orders    HEMOGLOBIN A1C W/ EAG ESTIMATION    TSH, 3rd generation with Free T4 reflex      Other Visit Diagnoses     Annual physical exam    -  Primary    Laboratory exam ordered as part of routine general medical examination        Relevant Orders    CBC and differential    Comprehensive metabolic panel    HEMOGLOBIN A1C W/ EAG ESTIMATION    Lipid Panel with Direct LDL reflex          Immunizations and preventive care screenings were discussed with patient today  Appropriate education was printed on patient's after visit summary  Counseling:  · Continue healthy diet and regular exercise         Return in about 1 year (around 2022)  Chief Complaint:     Chief Complaint   Patient presents with    Annual Exam      History of Present Illness:     Adult Annual Physical   Patient here for a comprehensive physical exam  The patient reports no problems  Seeing a functional medicine nutritionist  For 6months bec of acne, trouble losing weight  This has helped with these issues as well as constipation and heavy menses    Diet and Physical Activity  · Diet/Nutrition: well balanced diet  · Exercise: regular  Depression Screening  PHQ-9 Depression Screening    PHQ-9:   Frequency of the following problems over the past two weeks:      Little interest or pleasure in doing things: 0 - not at all  Feeling down, depressed, or hopeless: 0 - not at all  PHQ-2 Score: 0       General Health  · Sleep: sleeps well and gets 4-6 hours of sleep on average  · Hearing: normal - bilateral   · Vision: most recent eye exam <1 year ago and wears contacts  · Dental: regular dental visits         /GYN Health  · Last menstrual period: 5/15/2021  · Contraceptive method: none  abstinence  · History of STDs?: no      Review of Systems:     Review of Systems   Constitutional: Negative for chills and fever  Respiratory: Negative for cough and shortness of breath  Cardiovascular: Negative for chest pain and palpitations  Gastrointestinal: Positive for constipation (better with current diet)  Negative for abdominal pain, diarrhea, nausea and vomiting  Genitourinary: Positive for menstrual problem (not as heavy in the past 3 months on current diet)  Negative for difficulty urinating  Musculoskeletal: Positive for back pain (left upper back)  Neurological: Negative for dizziness and headaches  Psychiatric/Behavioral: Positive for sleep disturbance (not enough but better)        Past Medical History:     Past Medical History:   Diagnosis Date    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    Pelvic MRI revealed septated uterus    PCOS (polycystic ovarian syndrome)     Upper back pain     Wears glasses       Past Surgical History:     Past Surgical History:   Procedure Laterality Date    KNEE SURGERY      Skin Debridement    TOOTH EXTRACTION        Social History:        Social History     Socioeconomic History    Marital status: Single     Spouse name: None    Number of children: None    Years of education: None    Highest education level: None   Occupational History    None   Social Needs    Financial resource strain: None    Food insecurity     Worry: None     Inability: None    Transportation needs     Medical: None     Non-medical: None   Tobacco Use    Smoking status: Never Smoker    Smokeless tobacco: Never Used   Substance and Sexual Activity    Alcohol use: No    Drug use: No    Sexual activity: Not Currently   Lifestyle    Physical activity     Days per week: None     Minutes per session: None    Stress: None   Relationships    Social connections     Talks on phone: None     Gets together: None Attends Faith service: None     Active member of club or organization: None     Attends meetings of clubs or organizations: None     Relationship status: None    Intimate partner violence     Fear of current or ex partner: None     Emotionally abused: None     Physically abused: None     Forced sexual activity: None   Other Topics Concern    None   Social History Narrative    None      Family History:     Family History   Problem Relation Age of Onset    Prostate cancer Maternal Grandfather [de-identified]    Lung cancer Paternal Grandmother 72    Psychiatric Illness Family     Pancreatic cancer Mother 61    Asthma Mother     Hypertension Father       Current Medications:     Current Outpatient Medications   Medication Sig Dispense Refill    clindamycin (CLEOCIN T) 1 % lotion       Multiple Vitamin (MULTI-DAY VITAMINS) TABS Take by mouth      tretinoin (ALTRALIN) 1 69 % Apply 1 application topically daily at bedtime To affected area      chlorhexidine (PERIDEX) 0 12 % solution   3    ibuprofen (MOTRIN) 800 mg tablet Take 1 tablet (800 mg total) by mouth every 8 (eight) hours as needed for moderate pain (Patient not taking: Reported on 6/8/2021) 60 tablet 1    PREVIDENT 5000 SENSITIVE 1 1-5 % PSTE   5    tiZANidine (ZANAFLEX) 4 mg tablet Take 1 tablet (4 mg total) by mouth every 8 (eight) hours as needed for muscle spasms (Patient not taking: Reported on 6/8/2021) 30 tablet 0     No current facility-administered medications for this visit  Allergies:      Allergies   Allergen Reactions    Sulfa Antibiotics Hives, Itching and Fever    Amoxicillin Fever, Hives, Itching and Rash    Mold Extract [Trichophyton] Allergic Rhinitis    Penicillins Fever, Hives, Itching and Rash      Physical Exam:     /60   Pulse 55   Temp 97 5 °F (36 4 °C) (Temporal)   Ht 5' 6" (1 676 m)   Wt 68 kg (150 lb)   SpO2 100%   BMI 24 21 kg/m²     Physical Exam  Constitutional:       General: She is not in acute distress  Appearance: She is well-developed  She is not ill-appearing, toxic-appearing or diaphoretic  HENT:      Head: Normocephalic and atraumatic  Right Ear: External ear normal       Left Ear: External ear normal    Eyes:      Conjunctiva/sclera: Conjunctivae normal    Neck:      Musculoskeletal: Neck supple  Cardiovascular:      Rate and Rhythm: Normal rate and regular rhythm  Heart sounds: Normal heart sounds  No murmur  Pulmonary:      Effort: Pulmonary effort is normal  No respiratory distress  Breath sounds: Normal breath sounds  No stridor  No wheezing or rales  Abdominal:      General: There is no distension  Palpations: Abdomen is soft  There is no mass  Tenderness: There is no abdominal tenderness  There is no guarding or rebound  Musculoskeletal:      Right lower leg: No edema  Left lower leg: No edema  Skin:     General: Skin is warm and dry  Neurological:      Mental Status: She is alert and oriented to person, place, and time  Psychiatric:         Mood and Affect: Mood normal          Behavior: Behavior normal          Thought Content:  Thought content normal          Judgment: Judgment normal           Lai Sargent MD   [facility]

## 2021-06-08 NOTE — PATIENT INSTRUCTIONS

## 2021-07-08 ENCOUNTER — OFFICE VISIT (OUTPATIENT)
Dept: INTERNAL MEDICINE CLINIC | Facility: CLINIC | Age: 36
End: 2021-07-08
Payer: COMMERCIAL

## 2021-07-08 VITALS
SYSTOLIC BLOOD PRESSURE: 109 MMHG | HEIGHT: 66 IN | HEART RATE: 70 BPM | BODY MASS INDEX: 23.75 KG/M2 | OXYGEN SATURATION: 99 % | DIASTOLIC BLOOD PRESSURE: 70 MMHG | TEMPERATURE: 97 F | WEIGHT: 147.8 LBS

## 2021-07-08 DIAGNOSIS — R10.31 RIGHT LOWER QUADRANT ABDOMINAL PAIN: Primary | ICD-10-CM

## 2021-07-08 DIAGNOSIS — E28.2 PCOS (POLYCYSTIC OVARIAN SYNDROME): ICD-10-CM

## 2021-07-08 PROCEDURE — 99213 OFFICE O/P EST LOW 20 MIN: CPT | Performed by: NURSE PRACTITIONER

## 2021-07-08 NOTE — PROGRESS NOTES
Assessment/Plan:    Right lower quadrant abdominal pain  Ct of abdomen to evaluate pain    PCOS (polycystic ovarian syndrome)  Follow up with gyn for treatment       Diagnoses and all orders for this visit:    Right lower quadrant abdominal pain  -     CT abdomen pelvis wo contrast; Future    PCOS (polycystic ovarian syndrome)          Subjective:      Patient ID: Beau Wolff is a 28 y o  female  Few months of off and on pain/burning right lower quadrant  Also has pcos   +constipation  Mom had pancreatic cancer   Few minutes about 7/10  No fever, blood in the stool, diarrhea      The following portions of the patient's history were reviewed and updated as appropriate: allergies, current medications, past family history, past medical history, past social history, past surgical history and problem list     Review of Systems   Constitutional: Negative  HENT: Negative  Eyes: Negative  Respiratory: Negative  Cardiovascular: Negative  Gastrointestinal: Positive for abdominal pain and constipation  Musculoskeletal: Negative  Neurological: Negative  Objective:      /70   Pulse 70   Temp (!) 97 °F (36 1 °C) (Temporal)   Ht 5' 6" (1 676 m)   Wt 67 kg (147 lb 12 8 oz)   SpO2 99%   BMI 23 86 kg/m²          Physical Exam  Vitals and nursing note reviewed  Constitutional:       Appearance: She is well-developed  HENT:      Head: Normocephalic and atraumatic  Right Ear: External ear normal       Left Ear: External ear normal       Nose: Nose normal    Eyes:      Conjunctiva/sclera: Conjunctivae normal       Pupils: Pupils are equal, round, and reactive to light  Cardiovascular:      Rate and Rhythm: Normal rate and regular rhythm  Pulmonary:      Effort: Pulmonary effort is normal       Breath sounds: Normal breath sounds  Abdominal:      General: Bowel sounds are normal       Palpations: Abdomen is soft  Musculoskeletal:         General: Normal range of motion  Cervical back: Normal range of motion and neck supple  Skin:     General: Skin is warm and dry  Neurological:      Mental Status: She is alert and oriented to person, place, and time

## 2021-07-09 ENCOUNTER — HOSPITAL ENCOUNTER (OUTPATIENT)
Dept: CT IMAGING | Facility: HOSPITAL | Age: 36
Discharge: HOME | End: 2021-07-09
Payer: COMMERCIAL

## 2021-07-09 DIAGNOSIS — R10.31 RIGHT LOWER QUADRANT ABDOMINAL PAIN: ICD-10-CM

## 2021-07-09 PROCEDURE — 74176 CT ABD & PELVIS W/O CONTRAST: CPT

## 2021-07-09 PROCEDURE — G1004 CDSM NDSC: HCPCS

## 2021-09-15 ENCOUNTER — ANNUAL EXAM (OUTPATIENT)
Dept: OBGYN CLINIC | Facility: CLINIC | Age: 36
End: 2021-09-15
Payer: COMMERCIAL

## 2021-09-15 VITALS
WEIGHT: 146 LBS | BODY MASS INDEX: 23.46 KG/M2 | DIASTOLIC BLOOD PRESSURE: 70 MMHG | SYSTOLIC BLOOD PRESSURE: 112 MMHG | HEIGHT: 66 IN

## 2021-09-15 DIAGNOSIS — R92.2 DENSE BREAST TISSUE: ICD-10-CM

## 2021-09-15 DIAGNOSIS — N60.12 FIBROCYSTIC BREAST CHANGES OF BOTH BREASTS: ICD-10-CM

## 2021-09-15 DIAGNOSIS — N60.11 FIBROCYSTIC BREAST CHANGES OF BOTH BREASTS: ICD-10-CM

## 2021-09-15 DIAGNOSIS — Z01.419 ENCOUNTER FOR ANNUAL ROUTINE GYNECOLOGICAL EXAMINATION: Primary | ICD-10-CM

## 2021-09-15 DIAGNOSIS — E28.2 PCOS (POLYCYSTIC OVARIAN SYNDROME): ICD-10-CM

## 2021-09-15 PROCEDURE — G0145 SCR C/V CYTO,THINLAYER,RESCR: HCPCS | Performed by: OBSTETRICS & GYNECOLOGY

## 2021-09-15 PROCEDURE — G0476 HPV COMBO ASSAY CA SCREEN: HCPCS | Performed by: OBSTETRICS & GYNECOLOGY

## 2021-09-15 PROCEDURE — 99395 PREV VISIT EST AGE 18-39: CPT | Performed by: OBSTETRICS & GYNECOLOGY

## 2021-09-15 RX ORDER — CLINDAMYCIN PHOSPHATE 10 MG/G
GEL TOPICAL
COMMUNITY
Start: 2021-09-02

## 2021-09-15 NOTE — PROGRESS NOTES
Assessment/Plan:   Pap smear done as well as annual   Encouraged self-breast examination as well as calcium supplementation  Discussed various forms of contraception including hormonal versus nonhormonal   She will likely use condoms should she become sexually active  She will continue to follow-up with her breast specialist as scheduled  Return to office in 1 year or p r n  No problem-specific Assessment & Plan notes found for this encounter  Diagnoses and all orders for this visit:    Encounter for annual routine gynecological examination    PCOS (polycystic ovarian syndrome)    Fibrocystic breast changes of both breasts    Dense breast tissue    Other orders  -     clindamycin (CLINDAGEL) 1 % gel          Subjective:      Patient ID: Andree Banegas is a 28 y o  female  HPI      this is a very pleasant 60-year-old female G0 who presents for her gyn exam   She has gone through some lifestyle changes in the course of the year  She has been seeing a holistic nutritional is  She has altered her diet significantly which resulted in improved acne  She has had a 10 lb weight loss  She is exercising regularly  She has been at a new job location in the last year  She did receive the COVID vaccine in April and in May and subsequently had a 50 day menstrual cycle followed by now 30 day menstrual cycle lasting 6-7 days  She never tried lysteda last year to see if her menstrual cycles will improved  She would like to take a more natural approach  She has been in a monogamous relationship for close to 2 years  They have not been sexually active  They have been talking about future marriage  Patient was evaluated by Dr Tressa RUBIN for possible septated uterus  She had another pelvic MRI which was more suggestive of bicornuate uterus  She does have a history of polycystic ovaries  Her and her partner may consider future pregnancies after marriage      The following portions of the patient's history were reviewed and updated as appropriate: allergies, current medications, past family history, past medical history, past social history, past surgical history and problem list     Review of Systems   Constitutional: Negative for fatigue, fever and unexpected weight change  Respiratory: Negative for cough, chest tightness, shortness of breath and wheezing  Cardiovascular: Negative  Negative for chest pain and palpitations  Gastrointestinal: Negative  Negative for abdominal distention, abdominal pain, blood in stool, constipation, diarrhea, nausea and vomiting  Genitourinary: Negative  Negative for difficulty urinating, dyspareunia, dysuria, flank pain, frequency, genital sores, hematuria, pelvic pain, urgency, vaginal bleeding, vaginal discharge and vaginal pain  Skin: Negative for rash  Objective:      /70   Ht 5' 6" (1 676 m)   Wt 66 2 kg (146 lb)   LMP 09/10/2021 (Exact Date)   Breastfeeding No   BMI 23 57 kg/m²          Physical Exam  Constitutional:       Appearance: Normal appearance  She is well-developed  Cardiovascular:      Rate and Rhythm: Normal rate and regular rhythm  Pulmonary:      Effort: Pulmonary effort is normal       Breath sounds: Normal breath sounds  Chest:      Breasts:         Right: No inverted nipple, mass, nipple discharge, skin change or tenderness  Left: No inverted nipple, mass, nipple discharge, skin change or tenderness  Abdominal:      General: Bowel sounds are normal  There is no distension  Palpations: Abdomen is soft  Tenderness: There is no abdominal tenderness  There is no guarding or rebound  Genitourinary:     Labia:         Right: No rash, tenderness or lesion  Left: No rash, tenderness or lesion  Vagina: Normal  No signs of injury  No vaginal discharge or tenderness  Cervix: No cervical motion tenderness, discharge, friability, lesion, erythema or cervical bleeding        Uterus: Not enlarged and not tender  Adnexa:         Right: No mass, tenderness or fullness  Left: No mass, tenderness or fullness  Neurological:      Mental Status: She is alert and oriented to person, place, and time     Psychiatric:         Behavior: Behavior normal

## 2021-09-21 LAB
LAB AP GYN PRIMARY INTERPRETATION: NORMAL
LAB AP LMP: NORMAL
Lab: NORMAL

## 2021-10-11 ENCOUNTER — TELEMEDICINE (OUTPATIENT)
Dept: INTERNAL MEDICINE CLINIC | Facility: CLINIC | Age: 36
End: 2021-10-11
Payer: COMMERCIAL

## 2021-10-11 DIAGNOSIS — Z20.822 EXPOSURE TO COVID-19 VIRUS: Primary | ICD-10-CM

## 2021-10-11 PROCEDURE — 87635 SARS-COV-2 COVID-19 AMP PRB: CPT | Performed by: NURSE PRACTITIONER

## 2021-10-11 PROCEDURE — 99442 PR PHYS/QHP TELEPHONE EVALUATION 11-20 MIN: CPT | Performed by: NURSE PRACTITIONER

## 2021-10-20 ENCOUNTER — TELEPHONE (OUTPATIENT)
Dept: OBGYN CLINIC | Facility: CLINIC | Age: 36
End: 2021-10-20

## 2021-10-20 DIAGNOSIS — N92.0 MENORRHAGIA WITH REGULAR CYCLE: Primary | ICD-10-CM

## 2021-10-25 ENCOUNTER — APPOINTMENT (OUTPATIENT)
Dept: LAB | Facility: CLINIC | Age: 36
End: 2021-10-25
Payer: COMMERCIAL

## 2021-10-25 DIAGNOSIS — N92.0 MENORRHAGIA WITH REGULAR CYCLE: ICD-10-CM

## 2021-10-25 LAB
BASOPHILS # BLD AUTO: 0.05 THOUSANDS/ΜL (ref 0–0.1)
BASOPHILS NFR BLD AUTO: 1 % (ref 0–1)
EOSINOPHIL # BLD AUTO: 0.08 THOUSAND/ΜL (ref 0–0.61)
EOSINOPHIL NFR BLD AUTO: 1 % (ref 0–6)
ERYTHROCYTE [DISTWIDTH] IN BLOOD BY AUTOMATED COUNT: 12.8 % (ref 11.6–15.1)
FERRITIN SERPL-MCNC: 39 NG/ML (ref 8–388)
HCT VFR BLD AUTO: 39.3 % (ref 34.8–46.1)
HGB BLD-MCNC: 12.8 G/DL (ref 11.5–15.4)
IMM GRANULOCYTES # BLD AUTO: 0.04 THOUSAND/UL (ref 0–0.2)
IMM GRANULOCYTES NFR BLD AUTO: 1 % (ref 0–2)
IRON SERPL-MCNC: 132 UG/DL (ref 50–170)
LYMPHOCYTES # BLD AUTO: 1.43 THOUSANDS/ΜL (ref 0.6–4.47)
LYMPHOCYTES NFR BLD AUTO: 17 % (ref 14–44)
MCH RBC QN AUTO: 30.7 PG (ref 26.8–34.3)
MCHC RBC AUTO-ENTMCNC: 32.6 G/DL (ref 31.4–37.4)
MCV RBC AUTO: 94 FL (ref 82–98)
MONOCYTES # BLD AUTO: 0.43 THOUSAND/ΜL (ref 0.17–1.22)
MONOCYTES NFR BLD AUTO: 5 % (ref 4–12)
NEUTROPHILS # BLD AUTO: 6.31 THOUSANDS/ΜL (ref 1.85–7.62)
NEUTS SEG NFR BLD AUTO: 75 % (ref 43–75)
NRBC BLD AUTO-RTO: 0 /100 WBCS
PLATELET # BLD AUTO: 222 THOUSANDS/UL (ref 149–390)
PMV BLD AUTO: 10.9 FL (ref 8.9–12.7)
RBC # BLD AUTO: 4.17 MILLION/UL (ref 3.81–5.12)
WBC # BLD AUTO: 8.34 THOUSAND/UL (ref 4.31–10.16)

## 2021-10-25 PROCEDURE — 36415 COLL VENOUS BLD VENIPUNCTURE: CPT

## 2021-10-25 PROCEDURE — 83540 ASSAY OF IRON: CPT

## 2021-10-25 PROCEDURE — 85025 COMPLETE CBC W/AUTO DIFF WBC: CPT

## 2021-10-25 PROCEDURE — 82728 ASSAY OF FERRITIN: CPT

## 2021-10-26 ENCOUNTER — TELEPHONE (OUTPATIENT)
Dept: OBGYN CLINIC | Facility: CLINIC | Age: 36
End: 2021-10-26

## 2021-12-02 DIAGNOSIS — N94.6 DYSMENORRHEA: ICD-10-CM

## 2021-12-02 RX ORDER — IBUPROFEN 800 MG/1
800 TABLET ORAL EVERY 8 HOURS PRN
Qty: 60 TABLET | Refills: 0 | Status: SHIPPED | OUTPATIENT
Start: 2021-12-02

## 2022-03-15 ENCOUNTER — OFFICE VISIT (OUTPATIENT)
Dept: INTERNAL MEDICINE CLINIC | Facility: CLINIC | Age: 37
End: 2022-03-15
Payer: COMMERCIAL

## 2022-03-15 VITALS
HEIGHT: 66 IN | DIASTOLIC BLOOD PRESSURE: 64 MMHG | WEIGHT: 150.4 LBS | HEART RATE: 85 BPM | OXYGEN SATURATION: 99 % | BODY MASS INDEX: 24.17 KG/M2 | SYSTOLIC BLOOD PRESSURE: 102 MMHG

## 2022-03-15 DIAGNOSIS — S61.411A LACERATION OF RIGHT HAND, FOREIGN BODY PRESENCE UNSPECIFIED, INITIAL ENCOUNTER: Primary | ICD-10-CM

## 2022-03-15 PROCEDURE — 90715 TDAP VACCINE 7 YRS/> IM: CPT

## 2022-03-15 PROCEDURE — 90471 IMMUNIZATION ADMIN: CPT

## 2022-03-15 PROCEDURE — 99213 OFFICE O/P EST LOW 20 MIN: CPT | Performed by: NURSE PRACTITIONER

## 2022-03-15 NOTE — LETTER
March 15, 2022     Patient: Stephanie Haines   YOB: 1985   Date of Visit: 3/15/2022       To Whom it May Concern:    Stephanie Haines is under my professional care  She was seen in my office on 3/15/2022  We recommend that patient work from home this week, to return in office 3/21/22  Thank you         Sincerely,          BORIS Parra        CC: No Recipients

## 2022-03-15 NOTE — ASSESSMENT & PLAN NOTE
Steri-Strips on monitor for signs of infection which includes increased redness swelling and tenderness along with discharge   work from home this week return to work on Monday

## 2022-03-15 NOTE — PROGRESS NOTES
Assessment/Plan:    Laceration of right hand  Steri-Strips on monitor for signs of infection which includes increased redness swelling and tenderness along with discharge  work from home this week return to work on Monday       Diagnoses and all orders for this visit:    Laceration of right hand, foreign body presence unspecified, initial encounter  -     Tdap vaccine greater than or equal to 6yo IM          Subjective:      Patient ID: Adam Resendiz is a 39 y o  female  Patient is here for right hand laceration from a a knife while she was cooking  This happened yesterday  She apply pressure right away and applied Steri-Strips  Wound does not appear to be deep and she is able to move all of her fingers  She is due for a tetanus shot      The following portions of the patient's history were reviewed and updated as appropriate: allergies, current medications, past family history, past medical history, past social history, past surgical history and problem list     Review of Systems   Constitutional: Negative  HENT: Negative  Eyes: Negative  Respiratory: Negative  Cardiovascular: Negative  Gastrointestinal: Negative  Musculoskeletal: Negative  Skin: Positive for wound  Neurological: Negative  Objective:      /64   Pulse 85   Ht 5' 6" (1 676 m)   Wt 68 2 kg (150 lb 6 4 oz)   SpO2 99%   BMI 24 28 kg/m²          Physical Exam  Vitals reviewed  Musculoskeletal:        Arms:    Neurological:      Mental Status: She is alert

## 2022-04-14 ENCOUNTER — OFFICE VISIT (OUTPATIENT)
Dept: SURGICAL ONCOLOGY | Facility: CLINIC | Age: 37
End: 2022-04-14
Payer: COMMERCIAL

## 2022-04-14 VITALS
HEART RATE: 70 BPM | WEIGHT: 148 LBS | DIASTOLIC BLOOD PRESSURE: 64 MMHG | SYSTOLIC BLOOD PRESSURE: 106 MMHG | OXYGEN SATURATION: 100 % | TEMPERATURE: 98.1 F | BODY MASS INDEX: 23.78 KG/M2 | HEIGHT: 66 IN | RESPIRATION RATE: 16 BRPM

## 2022-04-14 DIAGNOSIS — R92.2 DENSE BREAST TISSUE: ICD-10-CM

## 2022-04-14 DIAGNOSIS — N60.11 FIBROCYSTIC BREAST CHANGES OF BOTH BREASTS: Primary | ICD-10-CM

## 2022-04-14 DIAGNOSIS — N60.12 FIBROCYSTIC BREAST CHANGES OF BOTH BREASTS: Primary | ICD-10-CM

## 2022-04-14 PROCEDURE — 99213 OFFICE O/P EST LOW 20 MIN: CPT | Performed by: SURGERY

## 2022-04-14 NOTE — PROGRESS NOTES
Surgical Oncology Follow Up       [address] [place]  CANCER [facility] SURGICAL ONCOLOGY [place]  [address] [address]    Anthony Gomez  1985  [id number]  [address] [facility] SURGICAL ONCOLOGY [place]  [address] [id number]    Chief Complaint   Patient presents with    Follow-up       Assessment/Plan   Diagnoses and all orders for this visit:    Fibrocystic breast changes of both breasts    Dense breast tissue        Advance Care Planning/Advance Directives:  Did not discuss  with the patient  Oncology History:    Oncology History    No history exists  History of Present Illness: Follow-up visit secondary to dense breast tissue and fibrocystic changes reports no current breast referable concerns  -Interval History:  None    Review of Systems:  Review of Systems   Constitutional: Negative  Negative for appetite change and fever  Eyes: Negative  Respiratory: Negative for shortness of breath  Cardiovascular: Negative  Gastrointestinal: Negative  Endocrine: Negative  Genitourinary: Negative  Musculoskeletal: Negative  Negative for arthralgias and myalgias  Skin: Negative  Allergic/Immunologic: Negative  Neurological: Negative  Hematological: Negative  Negative for adenopathy  Does not bruise/bleed easily  Psychiatric/Behavioral: Negative          Patient Active Problem List   Diagnosis    Fibrocystic breast changes of both breasts    Dense breast tissue    PCOS (polycystic ovarian syndrome)    Chronic foot pain, right    Periodontitis    Right lower quadrant abdominal pain    Laceration of right hand     Past Medical History:   Diagnosis Date    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    Pelvic MRI revealed septated uterus    PCOS (polycystic ovarian syndrome)     Upper back pain     Wears glasses      Past Surgical History:   Procedure Laterality Date    KNEE SURGERY      Skin Debridement    TOOTH EXTRACTION       Family History   Problem Relation Age of Onset    Prostate cancer Maternal Grandfather [de-identified]    Lung cancer Paternal Grandmother 72    Psychiatric Illness Family     Pancreatic cancer Mother 61    Asthma Mother     Hypertension Father      Social History     Socioeconomic History    Marital status: Single     Spouse name: Not on file    Number of children: Not on file    Years of education: Not on file    Highest education level: Not on file   Occupational History    Not on file   Tobacco Use    Smoking status: Never Smoker    Smokeless tobacco: Never Used   Vaping Use    Vaping Use: Never used   Substance and Sexual Activity    Alcohol use: No    Drug use: No    Sexual activity: Not Currently   Other Topics Concern    Not on file   Social History Narrative    Not on file     Social Determinants of Health     Financial Resource Strain: Not on file   Food Insecurity: Not on file   Transportation Needs: Not on file   Physical Activity: Not on file   Stress: Not on file   Social Connections: Not on file   Intimate Partner Violence: Not on file   Housing Stability: Not on file       Current Outpatient Medications:     clindamycin (CLINDAGEL) 1 % gel, , Disp: , Rfl:     ibuprofen (MOTRIN) 800 mg tablet, Take 1 tablet (800 mg total) by mouth every 8 (eight) hours as needed for moderate pain, Disp: 60 tablet, Rfl: 0    Multiple Vitamin (MULTI-DAY VITAMINS) TABS, Take by mouth, Disp: , Rfl:     tiZANidine (ZANAFLEX) 4 mg tablet, Take 1 tablet (4 mg total) by mouth every 8 (eight) hours as needed for muscle spasms, Disp: 30 tablet, Rfl: 0    tretinoin (ALTRALIN) 0 77 %, Apply 1 application topically daily at bedtime To affected area, Disp: , Rfl:     clindamycin (CLEOCIN T) 1 % lotion, , Disp: , Rfl:   Allergies   Allergen Reactions    Sulfa Antibiotics Hives, Itching and Fever    Gluten Meal - Food Allergy GI Intolerance    Amoxicillin Fever, Hives, Itching and Rash    Mold Extract [Trichophyton] Allergic Rhinitis    Penicillins Fever, Hives, Itching and Rash       The following portions of the patient's history were reviewed and updated as appropriate: allergies, current medications, past family history, past medical history, past social history, past surgical history and problem list         Vitals:    04/14/22 1107   BP: 106/64   Pulse: 70   Resp: 16   Temp: 98 1 °F (36 7 °C)   SpO2: 100%       Physical Exam  Constitutional:       General: She is not in acute distress  Appearance: Normal appearance  She is well-developed  HENT:      Head: Normocephalic and atraumatic  Chest:   Breasts:      Right: No inverted nipple, mass, nipple discharge, skin change, tenderness, axillary adenopathy or supraclavicular adenopathy  Left: No inverted nipple, mass, nipple discharge, skin change, tenderness, axillary adenopathy or supraclavicular adenopathy  Comments: Bilateral nodularity, left greater than right however less pronounced than on prior exams  Lymphadenopathy:      Upper Body:      Right upper body: No supraclavicular, axillary or pectoral adenopathy  Left upper body: No supraclavicular, axillary or pectoral adenopathy  Neurological:      Mental Status: She is alert and oriented to person, place, and time  Psychiatric:         Mood and Affect: Mood normal                Discussion/Summary:  27-year-old female with dense breast tissue and fibrocystic changes  There are no concerns on exam today  Her nodularity has decreased somewhat  She states that she is using evening Primrose oil  I will plan to see her again in one year for another exam or sooner should the need arise

## 2022-04-25 ENCOUNTER — APPOINTMENT (OUTPATIENT)
Dept: LAB | Facility: CLINIC | Age: 37
End: 2022-04-25

## 2022-04-25 DIAGNOSIS — Z00.8 HEALTH EXAMINATION IN POPULATION SURVEY: ICD-10-CM

## 2022-04-25 LAB
CHOLEST SERPL-MCNC: 142 MG/DL
EST. AVERAGE GLUCOSE BLD GHB EST-MCNC: 97 MG/DL
HBA1C MFR BLD: 5 %
HDLC SERPL-MCNC: 64 MG/DL
LDLC SERPL CALC-MCNC: 69 MG/DL (ref 0–100)
NONHDLC SERPL-MCNC: 78 MG/DL
TRIGL SERPL-MCNC: 43 MG/DL

## 2022-04-25 PROCEDURE — 36415 COLL VENOUS BLD VENIPUNCTURE: CPT

## 2022-04-25 PROCEDURE — 80061 LIPID PANEL: CPT

## 2022-04-25 PROCEDURE — 83036 HEMOGLOBIN GLYCOSYLATED A1C: CPT

## 2022-07-18 ENCOUNTER — APPOINTMENT (OUTPATIENT)
Dept: RADIOLOGY | Facility: CLINIC | Age: 37
End: 2022-07-18
Payer: COMMERCIAL

## 2022-07-18 DIAGNOSIS — M79.641 RIGHT HAND PAIN: ICD-10-CM

## 2022-07-18 DIAGNOSIS — M79.642 LEFT HAND PAIN: Primary | ICD-10-CM

## 2022-07-18 PROCEDURE — 73130 X-RAY EXAM OF HAND: CPT

## 2022-10-11 PROBLEM — S61.411A LACERATION OF RIGHT HAND: Status: RESOLVED | Noted: 2022-03-15 | Resolved: 2022-10-11

## 2022-11-17 ENCOUNTER — ANNUAL EXAM (OUTPATIENT)
Dept: OBGYN CLINIC | Facility: CLINIC | Age: 37
End: 2022-11-17

## 2022-11-17 VITALS
SYSTOLIC BLOOD PRESSURE: 114 MMHG | HEIGHT: 66 IN | WEIGHT: 160 LBS | BODY MASS INDEX: 25.71 KG/M2 | DIASTOLIC BLOOD PRESSURE: 74 MMHG

## 2022-11-17 DIAGNOSIS — Z01.419 ENCOUNTER FOR ANNUAL ROUTINE GYNECOLOGICAL EXAMINATION: Primary | ICD-10-CM

## 2022-11-17 NOTE — PROGRESS NOTES
Assessment/Plan:  Pap deferred due to low risk  Encouraged self-breast examination as well as calcium supplementation  She will continue evening [place]  Discussed contraception should she become sexually active, will use condoms  Also discussed pre conceptual folic acid  Implications of advanced maternal age reviewed  All questions answered  She will return to office in 1 year or p r n  No problem-specific Assessment & Plan notes found for this encounter  There are no diagnoses linked to this encounter  Subjective:      Patient ID: Michael Terrell is a 40 y o  female  HPI     This is a very pleasant 80-year-old female G0 who presents for gyn exam   She states her menstrual cycles are regular approximately every 28 days lasting 5 days, less heavy over the last year  She denies any breakthrough bleeding  Cramping has decreased  There has been no changes in bowel or bladder function  She has been in a monogamous relationship for 3 years, recently engaged, will be marrying 05/2023  They have never been sexually active  They may be interested in future pregnancies  Patient will be starting a new job next week  She is also now living with her iftikhar's parents  She has been seeing a   She is eating healthy, exercising regularly  The following portions of the patient's history were reviewed and updated as appropriate: allergies, current medications, past family history, past medical history, past social history, past surgical history and problem list     Review of Systems   Constitutional: Negative for fatigue, fever and unexpected weight change  Respiratory: Negative for cough, chest tightness, shortness of breath and wheezing  Cardiovascular: Negative  Negative for chest pain and palpitations  Gastrointestinal: Negative  Negative for abdominal distention, abdominal pain, blood in stool, constipation, diarrhea, nausea and vomiting  Genitourinary: Negative    Negative for difficulty urinating, dyspareunia, dysuria, flank pain, frequency, genital sores, hematuria, pelvic pain, urgency, vaginal bleeding, vaginal discharge and vaginal pain  Skin: Negative for rash  Objective:      /74   Ht 5' 6" (1 676 m)   Wt 72 6 kg (160 lb)   LMP 11/17/2022   BMI 25 82 kg/m²          Physical Exam  Constitutional:       Appearance: Normal appearance  She is well-developed and well-nourished  Cardiovascular:      Rate and Rhythm: Normal rate and regular rhythm  Pulmonary:      Effort: Pulmonary effort is normal       Breath sounds: Normal breath sounds  Chest:   Breasts:     Right: No inverted nipple, mass, nipple discharge, skin change or tenderness  Left: No inverted nipple, mass, nipple discharge, skin change or tenderness  Abdominal:      General: Bowel sounds are normal  There is no distension  Palpations: Abdomen is soft  Tenderness: There is no abdominal tenderness  There is no guarding or rebound  Genitourinary:     Labia:         Right: No rash, tenderness or lesion  Left: No rash, tenderness or lesion  Vagina: Normal  No signs of injury  No vaginal discharge or tenderness  Cervix: No cervical motion tenderness, discharge, friability, lesion, erythema or cervical bleeding  Uterus: Normal  Not enlarged and not tender  Adnexa:         Right: No mass, tenderness or fullness  Left: No mass, tenderness or fullness  Neurological:      Mental Status: She is alert and oriented to person, place, and time     Psychiatric:         Behavior: Behavior normal

## 2023-04-28 ENCOUNTER — TELEPHONE (OUTPATIENT)
Dept: HEMATOLOGY ONCOLOGY | Facility: CLINIC | Age: 38
End: 2023-04-28

## 2023-04-28 NOTE — TELEPHONE ENCOUNTER
Patient Call    Who are you speaking with? Patient    If it is not the patient, are they listed on an active communication consent form? N/A   What is the reason for this call? The patient states she does not have insurance coverage currently and would like to know how much her follow up appointment would be to pay out of pocket  Does this require a call back? Yes   If a call back is required, please list best call back number [phone]   If a call back is required, advise that a message will be forwarded to their care team and someone will return their call as soon as possible  Did you relay this information to the patient?  Yes

## 2023-05-03 ENCOUNTER — TELEPHONE (OUTPATIENT)
Dept: SURGICAL ONCOLOGY | Facility: CLINIC | Age: 38
End: 2023-05-03

## 2023-05-03 NOTE — TELEPHONE ENCOUNTER
I left a message with approximate payment for the patient with   [facility]  I provided the hopeline for the patient if she has any questions

## 2023-05-04 ENCOUNTER — OFFICE VISIT (OUTPATIENT)
Dept: SURGICAL ONCOLOGY | Facility: CLINIC | Age: 38
End: 2023-05-04

## 2023-05-04 VITALS
DIASTOLIC BLOOD PRESSURE: 70 MMHG | BODY MASS INDEX: 25.23 KG/M2 | HEIGHT: 66 IN | HEART RATE: 74 BPM | RESPIRATION RATE: 22 BRPM | TEMPERATURE: 97.2 F | WEIGHT: 157 LBS | OXYGEN SATURATION: 98 % | SYSTOLIC BLOOD PRESSURE: 104 MMHG

## 2023-05-04 DIAGNOSIS — R92.2 DENSE BREAST TISSUE: ICD-10-CM

## 2023-05-04 DIAGNOSIS — N60.11 FIBROCYSTIC BREAST CHANGES OF BOTH BREASTS: Primary | ICD-10-CM

## 2023-05-04 DIAGNOSIS — N60.12 FIBROCYSTIC BREAST CHANGES OF BOTH BREASTS: Primary | ICD-10-CM

## 2023-05-04 NOTE — PROGRESS NOTES
Surgical Oncology Follow Up       [address] [place] [address]  [facility] [place]  [address] [address]    Ruth Cabello  1985  [id number]  [address] [place] [address]  [facility] [place] [address] [address] [address]    Chief Complaint   Patient presents with    Follow-up       Assessment/Plan   Diagnoses and all orders for this visit:    Fibrocystic breast changes of both breasts    Dense breast tissue        Advance Care Planning/Advance Directives:  Did not discuss  with the patient  Oncology History:    Oncology History    No history exists  History of Present Illness: Patient is here today for follow-up secondary to dense breast tissue and fibrocystic changes, she states that her pain has improved with changes in diet and supplementation  -Interval History: As noted    Review of Systems:  Review of Systems   Constitutional: Negative  Negative for appetite change and fever  Eyes: Negative  Respiratory: Negative for shortness of breath  Cardiovascular: Negative  Gastrointestinal: Negative  Endocrine: Negative  Genitourinary: Negative  Musculoskeletal: Negative  Negative for arthralgias and myalgias  Skin: Negative  Allergic/Immunologic: Negative  Neurological: Negative  Hematological: Negative  Negative for adenopathy  Does not bruise/bleed easily  Psychiatric/Behavioral: Negative          Patient Active Problem List   Diagnosis    Fibrocystic breast changes of both breasts    Dense breast tissue    PCOS (polycystic ovarian syndrome)    Chronic foot pain, right    Periodontitis    Right lower quadrant abdominal pain     Past Medical History:   Diagnosis Date    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    Pelvic MRI revealed septated uterus    PCOS (polycystic ovarian syndrome)     Upper back pain     Wears glasses      Past Surgical History:   Procedure Laterality Date  KNEE SURGERY      Skin Debridement    TOOTH EXTRACTION       Family History   Problem Relation Age of Onset    Prostate cancer Maternal Grandfather [de-identified]    Lung cancer Paternal Grandmother 72    Psychiatric Illness Family     Pancreatic cancer Mother 61    Asthma Mother     Hypertension Father      Social History     Socioeconomic History    Marital status: Single     Spouse name: Not on file    Number of children: Not on file    Years of education: Not on file    Highest education level: Not on file   Occupational History    Not on file   Tobacco Use    Smoking status: Never    Smokeless tobacco: Never   Vaping Use    Vaping Use: Never used   Substance and Sexual Activity    Alcohol use: No    Drug use: No    Sexual activity: Not Currently   Other Topics Concern    Not on file   Social History Narrative    Not on file     Social Determinants of Health     Financial Resource Strain: Not on file   Food Insecurity: Not on file   Transportation Needs: Not on file   Physical Activity: Not on file   Stress: Not on file   Social Connections: Not on file   Intimate Partner Violence: Not on file   Housing Stability: Not on file       Current Outpatient Medications:     clindamycin (CLINDAGEL) 1 % gel, , Disp: , Rfl:     ibuprofen (MOTRIN) 800 mg tablet, Take 1 tablet (800 mg total) by mouth every 8 (eight) hours as needed for moderate pain, Disp: 60 tablet, Rfl: 0    Multiple Vitamin (MULTI-DAY VITAMINS) TABS, Take by mouth, Disp: , Rfl:     tretinoin (ALTRALIN) 7 78 %, Apply 1 application topically daily at bedtime To affected area, Disp: , Rfl:   Allergies   Allergen Reactions    Sulfa Antibiotics Hives, Itching and Fever    Gluten Meal - Food Allergy GI Intolerance    Amoxicillin Fever, Hives, Itching and Rash    Mold Extract [Trichophyton] Allergic Rhinitis    Penicillins Fever, Hives, Itching and Rash       The following portions of the patient's history were reviewed and updated as appropriate: allergies, current medications, past family history, past medical history, past social history, past surgical history and problem list         Vitals:    05/04/23 1007   BP: 104/70   Pulse: 74   Resp: 22   Temp: (!) 97 2 °F (36 2 °C)   SpO2: 98%       Physical Exam  Constitutional:       General: She is not in acute distress  Appearance: Normal appearance  She is well-developed  HENT:      Head: Normocephalic and atraumatic  Chest:   Breasts:     Right: No inverted nipple, mass, nipple discharge, skin change or tenderness  Left: No inverted nipple, mass, nipple discharge, skin change or tenderness  Lymphadenopathy:      Upper Body:      Right upper body: No supraclavicular, axillary or pectoral adenopathy  Left upper body: No supraclavicular, axillary or pectoral adenopathy  Neurological:      Mental Status: She is alert and oriented to person, place, and time  Psychiatric:         Mood and Affect: Mood normal                Discussion/Summary: 27-year-old female with dense breast tissue and fibrocystic changes  She does have family history of pancreatic cancer in her mother who had negative genetic testing  The patient continues to have dense nodular tissue bilateral but no discrete masses  Her pain has improved with changes in diet and supplementation  I am not recommending any imaging at this juncture  I will see her again in 1 year or sooner should the need arise

## 2024-01-01 NOTE — PROGRESS NOTES
Surgical Oncology Follow Up       [address] [place] [address]  [facility] [place]  [address] [address]    Antonio Hodgson  1985  [id number]  [address] [place] [address]  [facility] [place]  [address] [facility] [address]    Chief Complaint   Patient presents with    Follow-up       Assessment/Plan   Diagnoses and all orders for this visit:    Dense breast tissue    Fibrocystic breast changes of both breasts        Advance Care Planning/Advance Directives:  Did not discuss  with the patient  Oncology History:    Oncology History    No history exists  History of Present Illness: Follow-up visit secondary to dense breast tissue and fibrocystic changes, no new concerns  -Interval History: none    Review of Systems:  Review of Systems   Constitutional: Negative  Negative for appetite change and fever  Eyes: Negative  Respiratory: Negative for shortness of breath  Cardiovascular: Negative  Gastrointestinal: Negative  Endocrine: Negative  Genitourinary: Negative  Musculoskeletal: Negative  Negative for arthralgias and myalgias  Skin: Negative  Allergic/Immunologic: Negative  Neurological: Negative  Hematological: Negative  Negative for adenopathy  Does not bruise/bleed easily  Psychiatric/Behavioral: Negative          Patient Active Problem List   Diagnosis    Fibrocystic breast changes of both breasts    Dense breast tissue    PCOS (polycystic ovarian syndrome)    Chronic foot pain, right    Periodontitis     Past Medical History:   Diagnosis Date    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    Pelvic MRI revealed septated uterus    PCOS (polycystic ovarian syndrome)     Upper back pain     Wears glasses      Past Surgical History:   Procedure Laterality Date    KNEE SURGERY      Skin Debridement    TOOTH EXTRACTION       Family History   Problem Relation Age of Onset    Prostate cancer Maternal Grandfather [de-identified]    Lung cancer Paternal Grandmother 72    Psychiatric Illness Family     Pancreatic cancer Mother 61    Asthma Mother     Hypertension Father      Social History     Socioeconomic History    Marital status: Single     Spouse name: Not on file    Number of children: Not on file    Years of education: Not on file    Highest education level: Not on file   Occupational History    Not on file   Social Needs    Financial resource strain: Not on file    Food insecurity     Worry: Not on file     Inability: Not on file    Transportation needs     Medical: Not on file     Non-medical: Not on file   Tobacco Use    Smoking status: Never Smoker    Smokeless tobacco: Never Used   Substance and Sexual Activity    Alcohol use: No    Drug use: No    Sexual activity: Not Currently   Lifestyle    Physical activity     Days per week: Not on file     Minutes per session: Not on file    Stress: Not on file   Relationships    Social connections     Talks on phone: Not on file     Gets together: Not on file     Attends L[employer]-day service: Not on file     Active member of club or organization: Not on file     Attends meetings of clubs or organizations: Not on file     Relationship status: Not on file    Intimate partner violence     Fear of current or ex partner: Not on file     Emotionally abused: Not on file     Physically abused: Not on file     Forced sexual activity: Not on file   Other Topics Concern    Not on file   Social History Narrative    Not on file       Current Outpatient Medications:     chlorhexidine (PERIDEX) 0 12 % solution, , Disp: , Rfl: 3    clindamycin (CLEOCIN T) 1 % lotion, , Disp: , Rfl:     ibuprofen (MOTRIN) 800 mg tablet, Take 1 tablet (800 mg total) by mouth every 8 (eight) hours as needed for moderate pain, Disp: 60 tablet, Rfl: 1    Multiple Vitamin (MULTI-DAY VITAMINS) TABS, Take by mouth, Disp: , Rfl:     PREVIDENT 5000 SENSITIVE 1 1-5 % PSTE, , Disp: , Rfl: 5    saccharomyces boulardii (FLORASTOR) 250 mg capsule, Take 250 mg by mouth daily, Disp: , Rfl:     tiZANidine (ZANAFLEX) 4 mg tablet, Take 1 tablet (4 mg total) by mouth every 8 (eight) hours as needed for muscle spasms, Disp: 30 tablet, Rfl: 0    tretinoin (ALTRALIN) 4 09 %, Apply 1 application topically daily at bedtime To affected area, Disp: , Rfl:   Allergies   Allergen Reactions    Sulfa Antibiotics Hives, Itching and Fever    Amoxicillin Fever, Hives, Itching and Rash    Mold Extract [Trichophyton] Allergic Rhinitis    Penicillins Fever, Hives, Itching and Rash       The following portions of the patient's history were reviewed and updated as appropriate: allergies, current medications, past family history, past medical history, past social history, past surgical history and problem list         Vitals:    04/08/21 0836   BP: 104/78   Pulse: 80   Temp: 98 °F (36 7 °C)       Physical Exam  Constitutional:       General: She is not in acute distress  Appearance: She is well-developed  HENT:      Head: Normocephalic and atraumatic  Cardiovascular:      Heart sounds: Normal heart sounds  Pulmonary:      Breath sounds: Normal breath sounds  Chest:      Breasts:         Right: No inverted nipple, mass, nipple discharge, skin change or tenderness  Left: No inverted nipple, mass, nipple discharge, skin change or tenderness  Comments:  Dense nodular tissue bilateral, more prominent on the left but no discrete masses  Abdominal:      Palpations: Abdomen is soft  Lymphadenopathy:      Upper Body:      Right upper body: No supraclavicular, axillary or pectoral adenopathy  Left upper body: No supraclavicular, axillary or pectoral adenopathy  Neurological:      Mental Status: She is alert and oriented to person, place, and time     Psychiatric:         Mood and Affect: Mood normal                Discussion/Summary: 70-year-old female with dense breast tissue and fibrocystic changes  There are no concerns on examination today  She does continue to have dense nodular tissue bilateral, the left side is more prominent  I will continue to see her on an annual basis or sooner should the need arise  unknown